# Patient Record
Sex: FEMALE | Race: WHITE | NOT HISPANIC OR LATINO | URBAN - METROPOLITAN AREA
[De-identification: names, ages, dates, MRNs, and addresses within clinical notes are randomized per-mention and may not be internally consistent; named-entity substitution may affect disease eponyms.]

---

## 2017-05-31 ENCOUNTER — OUTPATIENT (OUTPATIENT)
Dept: OUTPATIENT SERVICES | Facility: HOSPITAL | Age: 82
LOS: 1 days | Discharge: HOME | End: 2017-05-31

## 2017-06-28 DIAGNOSIS — N18.2 CHRONIC KIDNEY DISEASE, STAGE 2 (MILD): ICD-10-CM

## 2017-06-28 DIAGNOSIS — K76.89 OTHER SPECIFIED DISEASES OF LIVER: ICD-10-CM

## 2017-06-28 DIAGNOSIS — E78.00 PURE HYPERCHOLESTEROLEMIA, UNSPECIFIED: ICD-10-CM

## 2017-06-28 DIAGNOSIS — E55.9 VITAMIN D DEFICIENCY, UNSPECIFIED: ICD-10-CM

## 2017-06-28 DIAGNOSIS — N39.0 URINARY TRACT INFECTION, SITE NOT SPECIFIED: ICD-10-CM

## 2018-05-30 PROBLEM — Z00.00 ENCOUNTER FOR PREVENTIVE HEALTH EXAMINATION: Status: ACTIVE | Noted: 2018-05-30

## 2018-06-20 ENCOUNTER — OUTPATIENT (OUTPATIENT)
Dept: OUTPATIENT SERVICES | Facility: HOSPITAL | Age: 83
LOS: 1 days | Discharge: HOME | End: 2018-06-20

## 2018-06-20 DIAGNOSIS — I49.3 VENTRICULAR PREMATURE DEPOLARIZATION: ICD-10-CM

## 2018-06-20 DIAGNOSIS — E78.2 MIXED HYPERLIPIDEMIA: ICD-10-CM

## 2018-06-20 DIAGNOSIS — I10 ESSENTIAL (PRIMARY) HYPERTENSION: ICD-10-CM

## 2018-06-20 DIAGNOSIS — E11.9 TYPE 2 DIABETES MELLITUS WITHOUT COMPLICATIONS: ICD-10-CM

## 2018-06-28 ENCOUNTER — APPOINTMENT (OUTPATIENT)
Age: 83
End: 2018-06-28

## 2018-06-28 ENCOUNTER — OUTPATIENT (OUTPATIENT)
Dept: OUTPATIENT SERVICES | Facility: HOSPITAL | Age: 83
LOS: 1 days | Discharge: HOME | End: 2018-06-28

## 2018-06-28 VITALS
HEIGHT: 63 IN | WEIGHT: 134 LBS | BODY MASS INDEX: 23.74 KG/M2 | DIASTOLIC BLOOD PRESSURE: 65 MMHG | HEART RATE: 81 BPM | SYSTOLIC BLOOD PRESSURE: 122 MMHG

## 2018-06-28 DIAGNOSIS — Z86.79 PERSONAL HISTORY OF OTHER DISEASES OF THE CIRCULATORY SYSTEM: ICD-10-CM

## 2018-06-28 DIAGNOSIS — Z86.39 PERSONAL HISTORY OF OTHER ENDOCRINE, NUTRITIONAL AND METABOLIC DISEASE: ICD-10-CM

## 2018-06-28 DIAGNOSIS — Z78.9 OTHER SPECIFIED HEALTH STATUS: ICD-10-CM

## 2018-06-28 DIAGNOSIS — R31.9 HEMATURIA, UNSPECIFIED: ICD-10-CM

## 2018-06-28 DIAGNOSIS — Z83.3 FAMILY HISTORY OF DIABETES MELLITUS: ICD-10-CM

## 2018-06-28 DIAGNOSIS — Z82.5 FAMILY HISTORY OF ASTHMA AND OTHER CHRONIC LOWER RESPIRATORY DISEASES: ICD-10-CM

## 2018-06-28 RX ORDER — SIMVASTATIN 20 MG/1
20 TABLET, FILM COATED ORAL
Refills: 0 | Status: ACTIVE | COMMUNITY

## 2018-06-28 RX ORDER — GLIPIZIDE 5 MG/1
5 TABLET, FILM COATED, EXTENDED RELEASE ORAL
Refills: 0 | Status: ACTIVE | COMMUNITY

## 2018-06-28 RX ORDER — CLOPIDOGREL BISULFATE 75 MG/1
75 TABLET, FILM COATED ORAL
Refills: 0 | Status: ACTIVE | COMMUNITY

## 2018-06-28 RX ORDER — METFORMIN HYDROCHLORIDE 500 MG/1
500 TABLET, FILM COATED, EXTENDED RELEASE ORAL
Refills: 0 | Status: ACTIVE | COMMUNITY

## 2018-06-28 RX ORDER — ENALAPRIL MALEATE 2.5 MG/1
2.5 TABLET ORAL
Refills: 0 | Status: ACTIVE | COMMUNITY

## 2018-06-29 DIAGNOSIS — R31.9 HEMATURIA, UNSPECIFIED: ICD-10-CM

## 2018-06-29 LAB
APPEARANCE: CLEAR
BILIRUBIN URINE: NEGATIVE
BLOOD URINE: NEGATIVE
COLOR: YELLOW
GLUCOSE QUALITATIVE U: NEGATIVE MG/DL
KETONES URINE: NEGATIVE
LEUKOCYTE ESTERASE URINE: NEGATIVE
NITRITE URINE: NEGATIVE
PH URINE: 6
PROTEIN URINE: NEGATIVE MG/DL
SPECIFIC GRAVITY URINE: <=1.005
UROBILINOGEN URINE: 0.2 MG/DL (ref 0.2–?)

## 2018-07-02 LAB — BACTERIA UR CULT: NORMAL

## 2018-07-03 ENCOUNTER — INPATIENT (INPATIENT)
Facility: HOSPITAL | Age: 83
LOS: 2 days | Discharge: HOME | End: 2018-07-06
Attending: INTERNAL MEDICINE | Admitting: INTERNAL MEDICINE

## 2018-07-03 VITALS
TEMPERATURE: 99 F | OXYGEN SATURATION: 99 % | DIASTOLIC BLOOD PRESSURE: 68 MMHG | SYSTOLIC BLOOD PRESSURE: 154 MMHG | RESPIRATION RATE: 18 BRPM | HEART RATE: 79 BPM

## 2018-07-03 LAB
ALBUMIN SERPL ELPH-MCNC: 4.6 G/DL — SIGNIFICANT CHANGE UP (ref 3.5–5.2)
ALP SERPL-CCNC: 48 U/L — SIGNIFICANT CHANGE UP (ref 30–115)
ALT FLD-CCNC: 9 U/L — SIGNIFICANT CHANGE UP (ref 0–41)
ANION GAP SERPL CALC-SCNC: 16 MMOL/L — HIGH (ref 7–14)
APTT BLD: 31.8 SEC — SIGNIFICANT CHANGE UP (ref 27–39.2)
AST SERPL-CCNC: 15 U/L — SIGNIFICANT CHANGE UP (ref 0–41)
BASE EXCESS BLDV CALC-SCNC: 1 MMOL/L — SIGNIFICANT CHANGE UP (ref -2–2)
BASOPHILS # BLD AUTO: 0.05 K/UL — SIGNIFICANT CHANGE UP (ref 0–0.2)
BASOPHILS NFR BLD AUTO: 0.5 % — SIGNIFICANT CHANGE UP (ref 0–1)
BILIRUB SERPL-MCNC: 0.4 MG/DL — SIGNIFICANT CHANGE UP (ref 0.2–1.2)
BLD GP AB SCN SERPL QL: SIGNIFICANT CHANGE UP
BUN SERPL-MCNC: 17 MG/DL — SIGNIFICANT CHANGE UP (ref 10–20)
CA-I SERPL-SCNC: 1.21 MMOL/L — SIGNIFICANT CHANGE UP (ref 1.12–1.3)
CALCIUM SERPL-MCNC: 9.6 MG/DL — SIGNIFICANT CHANGE UP (ref 8.5–10.1)
CHLORIDE SERPL-SCNC: 99 MMOL/L — SIGNIFICANT CHANGE UP (ref 98–110)
CK MB CFR SERPL CALC: 1.8 NG/ML — SIGNIFICANT CHANGE UP (ref 0.6–6.3)
CK SERPL-CCNC: 181 U/L — SIGNIFICANT CHANGE UP (ref 0–225)
CO2 SERPL-SCNC: 21 MMOL/L — SIGNIFICANT CHANGE UP (ref 17–32)
CREAT SERPL-MCNC: 0.9 MG/DL — SIGNIFICANT CHANGE UP (ref 0.7–1.5)
EOSINOPHIL # BLD AUTO: 0.12 K/UL — SIGNIFICANT CHANGE UP (ref 0–0.7)
EOSINOPHIL NFR BLD AUTO: 1.2 % — SIGNIFICANT CHANGE UP (ref 0–8)
GAS PNL BLDV: 136 MMOL/L — SIGNIFICANT CHANGE UP (ref 136–145)
GAS PNL BLDV: SIGNIFICANT CHANGE UP
GLUCOSE SERPL-MCNC: 58 MG/DL — LOW (ref 70–99)
HCO3 BLDV-SCNC: 26 MMOL/L — SIGNIFICANT CHANGE UP (ref 22–29)
HCT VFR BLD CALC: 37.3 % — SIGNIFICANT CHANGE UP (ref 37–47)
HCT VFR BLDA CALC: 43.2 % — SIGNIFICANT CHANGE UP (ref 34–44)
HGB BLD CALC-MCNC: 14.1 G/DL — SIGNIFICANT CHANGE UP (ref 14–18)
HGB BLD-MCNC: 12.6 G/DL — SIGNIFICANT CHANGE UP (ref 12–16)
IMM GRANULOCYTES NFR BLD AUTO: 0.3 % — SIGNIFICANT CHANGE UP (ref 0.1–0.3)
INR BLD: 1.13 RATIO — SIGNIFICANT CHANGE UP (ref 0.65–1.3)
LACTATE BLDV-MCNC: 1.2 MMOL/L — SIGNIFICANT CHANGE UP (ref 0.5–1.6)
LACTATE SERPL-SCNC: 1.3 MMOL/L — SIGNIFICANT CHANGE UP (ref 0.5–2.2)
LYMPHOCYTES # BLD AUTO: 2.57 K/UL — SIGNIFICANT CHANGE UP (ref 1.2–3.4)
LYMPHOCYTES # BLD AUTO: 26.7 % — SIGNIFICANT CHANGE UP (ref 20.5–51.1)
MCHC RBC-ENTMCNC: 29.9 PG — SIGNIFICANT CHANGE UP (ref 27–31)
MCHC RBC-ENTMCNC: 33.8 G/DL — SIGNIFICANT CHANGE UP (ref 32–37)
MCV RBC AUTO: 88.6 FL — SIGNIFICANT CHANGE UP (ref 81–99)
MONOCYTES # BLD AUTO: 0.91 K/UL — HIGH (ref 0.1–0.6)
MONOCYTES NFR BLD AUTO: 9.4 % — HIGH (ref 1.7–9.3)
NEUTROPHILS # BLD AUTO: 5.95 K/UL — SIGNIFICANT CHANGE UP (ref 1.4–6.5)
NEUTROPHILS NFR BLD AUTO: 61.9 % — SIGNIFICANT CHANGE UP (ref 42.2–75.2)
NRBC # BLD: 0 /100 WBCS — SIGNIFICANT CHANGE UP (ref 0–0)
PCO2 BLDV: 42 MMHG — SIGNIFICANT CHANGE UP (ref 41–51)
PH BLDV: 7.4 — SIGNIFICANT CHANGE UP (ref 7.26–7.43)
PLATELET # BLD AUTO: 309 K/UL — SIGNIFICANT CHANGE UP (ref 130–400)
PO2 BLDV: 17 MMHG — LOW (ref 20–40)
POTASSIUM BLDV-SCNC: 4 MMOL/L — SIGNIFICANT CHANGE UP (ref 3.3–5.6)
POTASSIUM SERPL-MCNC: 4.4 MMOL/L — SIGNIFICANT CHANGE UP (ref 3.5–5)
POTASSIUM SERPL-SCNC: 4.4 MMOL/L — SIGNIFICANT CHANGE UP (ref 3.5–5)
PROT SERPL-MCNC: 6.8 G/DL — SIGNIFICANT CHANGE UP (ref 6–8)
PROTHROM AB SERPL-ACNC: 12.2 SEC — SIGNIFICANT CHANGE UP (ref 9.95–12.87)
RBC # BLD: 4.21 M/UL — SIGNIFICANT CHANGE UP (ref 4.2–5.4)
RBC # FLD: 13.6 % — SIGNIFICANT CHANGE UP (ref 11.5–14.5)
SAO2 % BLDV: 18 % — SIGNIFICANT CHANGE UP
SODIUM SERPL-SCNC: 136 MMOL/L — SIGNIFICANT CHANGE UP (ref 135–146)
TROPONIN T SERPL-MCNC: <0.01 NG/ML — SIGNIFICANT CHANGE UP
TYPE + AB SCN PNL BLD: SIGNIFICANT CHANGE UP
WBC # BLD: 9.63 K/UL — SIGNIFICANT CHANGE UP (ref 4.8–10.8)
WBC # FLD AUTO: 9.63 K/UL — SIGNIFICANT CHANGE UP (ref 4.8–10.8)

## 2018-07-03 RX ORDER — CLOPIDOGREL BISULFATE 75 MG/1
75 TABLET, FILM COATED ORAL DAILY
Qty: 0 | Refills: 0 | Status: DISCONTINUED | OUTPATIENT
Start: 2018-07-03 | End: 2018-07-05

## 2018-07-03 RX ORDER — GLUCAGON INJECTION, SOLUTION 0.5 MG/.1ML
1 INJECTION, SOLUTION SUBCUTANEOUS ONCE
Qty: 0 | Refills: 0 | Status: DISCONTINUED | OUTPATIENT
Start: 2018-07-03 | End: 2018-07-06

## 2018-07-03 RX ORDER — INSULIN LISPRO 100/ML
VIAL (ML) SUBCUTANEOUS
Qty: 0 | Refills: 0 | Status: DISCONTINUED | OUTPATIENT
Start: 2018-07-03 | End: 2018-07-06

## 2018-07-03 RX ORDER — DEXTROSE 50 % IN WATER 50 %
15 SYRINGE (ML) INTRAVENOUS ONCE
Qty: 0 | Refills: 0 | Status: DISCONTINUED | OUTPATIENT
Start: 2018-07-03 | End: 2018-07-06

## 2018-07-03 RX ORDER — DEXTROSE 50 % IN WATER 50 %
12.5 SYRINGE (ML) INTRAVENOUS ONCE
Qty: 0 | Refills: 0 | Status: DISCONTINUED | OUTPATIENT
Start: 2018-07-03 | End: 2018-07-06

## 2018-07-03 RX ORDER — SODIUM CHLORIDE 9 MG/ML
1000 INJECTION, SOLUTION INTRAVENOUS
Qty: 0 | Refills: 0 | Status: DISCONTINUED | OUTPATIENT
Start: 2018-07-03 | End: 2018-07-06

## 2018-07-03 RX ORDER — DEXTROSE 50 % IN WATER 50 %
25 SYRINGE (ML) INTRAVENOUS ONCE
Qty: 0 | Refills: 0 | Status: DISCONTINUED | OUTPATIENT
Start: 2018-07-03 | End: 2018-07-06

## 2018-07-03 RX ORDER — INSULIN LISPRO 100/ML
3 VIAL (ML) SUBCUTANEOUS
Qty: 0 | Refills: 0 | Status: DISCONTINUED | OUTPATIENT
Start: 2018-07-03 | End: 2018-07-06

## 2018-07-03 RX ORDER — ENOXAPARIN SODIUM 100 MG/ML
60 INJECTION SUBCUTANEOUS EVERY 12 HOURS
Qty: 0 | Refills: 0 | Status: DISCONTINUED | OUTPATIENT
Start: 2018-07-03 | End: 2018-07-05

## 2018-07-03 RX ORDER — SIMVASTATIN 20 MG/1
0 TABLET, FILM COATED ORAL
Qty: 0 | Refills: 0 | COMMUNITY

## 2018-07-03 RX ORDER — ENOXAPARIN SODIUM 100 MG/ML
40 INJECTION SUBCUTANEOUS AT BEDTIME
Qty: 0 | Refills: 0 | Status: DISCONTINUED | OUTPATIENT
Start: 2018-07-03 | End: 2018-07-03

## 2018-07-03 RX ORDER — SIMVASTATIN 20 MG/1
20 TABLET, FILM COATED ORAL AT BEDTIME
Qty: 0 | Refills: 0 | Status: DISCONTINUED | OUTPATIENT
Start: 2018-07-03 | End: 2018-07-03

## 2018-07-03 RX ORDER — ASPIRIN/CALCIUM CARB/MAGNESIUM 324 MG
81 TABLET ORAL DAILY
Qty: 0 | Refills: 0 | Status: DISCONTINUED | OUTPATIENT
Start: 2018-07-03 | End: 2018-07-06

## 2018-07-03 RX ORDER — CLOPIDOGREL BISULFATE 75 MG/1
0 TABLET, FILM COATED ORAL
Qty: 0 | Refills: 0 | COMMUNITY

## 2018-07-03 RX ORDER — INSULIN GLARGINE 100 [IU]/ML
9 INJECTION, SOLUTION SUBCUTANEOUS AT BEDTIME
Qty: 0 | Refills: 0 | Status: DISCONTINUED | OUTPATIENT
Start: 2018-07-03 | End: 2018-07-06

## 2018-07-03 RX ORDER — ATORVASTATIN CALCIUM 80 MG/1
80 TABLET, FILM COATED ORAL AT BEDTIME
Qty: 0 | Refills: 0 | Status: DISCONTINUED | OUTPATIENT
Start: 2018-07-03 | End: 2018-07-06

## 2018-07-03 NOTE — H&P ADULT - NSHPLABSRESULTS_GEN_ALL_CORE
12.6   9.63   )----------(  309       ( 03 Jul 2018 14:01 )               37.3        07-03    136  |  99  |  17  ----------------------------<  58<L>  4.4   |  21  |  0.9    Ca    9.6      03 Jul 2018 14:01    TPro  6.8  /  Alb  4.6  /  TBili  0.4  /  DBili  x   /  AST  15  /  ALT  9   /  AlkPhos  48  07-03      CT Head:  1. No CT evidence for acute intracranial hemorrhage or large territory   infarct.     2. Extensive chronic microvascular changes, mildly progressed since   1/19/2016.

## 2018-07-03 NOTE — H&P ADULT - NSHPPHYSICALEXAM_GEN_ALL_CORE
T(F): 98.1 (07-03-18 @ 17:03), Max: 99.1 (07-03-18 @ 13:54)  HR: 75 (07-03-18 @ 17:03) (70 - 79)  BP: 135/61 (07-03-18 @ 17:03) (124/58 - 154/68)  RR: 18 (07-03-18 @ 17:03) (18 - 18)  SpO2: 98% (07-03-18 @ 17:03) (98% - 100%)    PHYSICAL EXAM:  GENERAL: NAD, well-developed  HEAD:  Atraumatic, Normocephalic  EYES: EOMI, PERRLA, conjunctiva and sclera clear  NECK: Supple, No JVD  CHEST/LUNG: Clear to auscultation bilaterally; No wheeze  HEART: Regular rate and rhythm; No murmurs, rubs, or gallops  ABDOMEN: Soft, Nontender, Nondistended; Bowel sounds present  EXTREMITIES:  2+ Peripheral Pulses, No clubbing, cyanosis, or edema  PSYCH: AAOx3  NEUROLOGY: non-focal  SKIN: No rashes or lesions T(F): 98.1 (07-03-18 @ 17:03), Max: 99.1 (07-03-18 @ 13:54)  HR: 75 (07-03-18 @ 17:03) (70 - 79)  BP: 135/61 (07-03-18 @ 17:03) (124/58 - 154/68)  RR: 18 (07-03-18 @ 17:03) (18 - 18)  SpO2: 98% (07-03-18 @ 17:03) (98% - 100%)    PHYSICAL EXAM:  GENERAL: NAD, well-developed  HEAD:  Atraumatic, Normocephalic  EYES: EOMI, PERRLA, conjunctiva and sclera clear  NECK: Supple, No JVD  CHEST/LUNG: Clear to auscultation bilaterally; No wheeze  HEART: Regular rate and rhythm; No murmurs, rubs, or gallops  ABDOMEN: Soft, Nontender, Nondistended; Bowel sounds present  EXTREMITIES:  2+ Peripheral Pulses, No clubbing, cyanosis, or edema  PSYCH: AAOx3  NEUROLOGY: non-focal. NIHSS 0.  Alert oriented x 3.  Motor strenght 5/5.  Sensory intact.  CN 2 - 12 intact. No loss of proprioception.  No ataxia.    SKIN: No rashes or lesions

## 2018-07-03 NOTE — ED PROVIDER NOTE - ATTENDING CONTRIBUTION TO CARE
89 yo female with PMH DM, Afib, TIAs on Plavix presents with new onset confusion and slurred speech that started 1 hours prior to arrival. Pt was with daughter who states she was her normal self prior to that. Denies any falls or trauma. No fevers, chills, cough or weakness. Pt denies any HA, dizziness, CP, SOB or palpitations.     VITAL SIGNS: noted  CONSTITUTIONAL: Well-developed; well-nourished; in no acute distress  HEAD: Normocephalic; atraumatic  EYES: PERRL, EOM intact; conjunctiva and sclera clear  ENT: No nasal discharge; airway clear. MMM  NECK: Supple; non tender.    CARD: S1, S2 normal; no murmurs, gallops, or rubs. Regular rate and rhythm  RESP: CTAB/L, no wheezes, rales or rhonchi  ABD: Normal bowel sounds; soft; non-distended; non-tender   EXT: Normal ROM. No calf tenderness or edema. Distal pulses intact  NEURO: AAO x 3, + anomia and difficulty with reading sentences with some slurring, no facial asymmetry, negative pronator drift, no ataxia, no nystagmus, sensory equal and intact.

## 2018-07-03 NOTE — H&P ADULT - PMH
Atrial fibrillation    Diabetes mellitus    Hyperlipidemia    Hypertension    TIA (transient ischemic attack)

## 2018-07-03 NOTE — PROGRESS NOTE ADULT - SUBJECTIVE AND OBJECTIVE BOX
***STROKE ALERT CONSULT NOTE    Chief Complaint: stroke alert  Overhead page at 1:50 p.m.    Last known normal time: 12:20    HPI:  Pt is 88 year old female with hypertension, hyperlipidemia, diabetes, and newly diagnosed atrial fibrillation presents for aphasia of sudden onset.  Last known normal at 12:20 her daughter spoke to her at 12:50 and patient was having difficulty with her words.  EMS was activated and en route  some improvement was noted.  Patient did not have weakness or numbness and has two prior TIA episodes involving transient aphasia one in oct 2015   and another in Jan 2016.       PAST MEDICAL & SURGICAL HISTORY:  NIDDM  HTN  Hyperlipidemia  Aortic valve stenosis      FAMILY HISTORY:  noncontributory    Social History: (-) x 3    Allergies    penicillin (Anaphylaxis; Hives)    Intolerances        MEDICATIONS  (STANDING):  plavix 75 mg/day  simvastatin 20 mg/day  enalapril 2.5 mg /day  glyburide  5 mg bid  Metformin 500 mg bid  MEDICATIONS  (PRN):      Review of systems:    Constitutional: No fever, weight loss or fatigue    Eyes: No eye pain or discharge  ENMT:  No difficulty hearing; No sinus or throat pain  Neck: No pain or stiffness  Respiratory: No cough, wheezing, chills or hemoptysis  Cardiovascular: No chest pain, palpitations, shortness of breath, dyspnea on exertion  Gastrointestinal: No abdominal pain, nausea, vomiting or hematemesis; No diarrhea or constipation.   Genitourinary: No dysuria, frequency, hematuria or incontinence  Neurological: As per HPI  Skin: No rashes or lesions   Endocrine: No heat or cold intolerance; No hair loss  Musculoskeletal: No joint pain or swelling  Psychiatric: No depression, anxiety, mood swings  Heme/Lymph: No easy bruising or bleeding gums    Vital Signs Last 24 Hrs  T(C): 37.3 (03 Jul 2018 13:54), Max: 37.3 (03 Jul 2018 13:54)  T(F): 99.1 (03 Jul 2018 13:54), Max: 99.1 (03 Jul 2018 13:54)  HR: 79 (03 Jul 2018 13:54) (79 - 79)  BP: 154/68 (03 Jul 2018 13:54) (154/68 - 154/68)  BP(mean): --  RR: 18 (03 Jul 2018 13:54) (18 - 18)  SpO2: 99% (03 Jul 2018 13:54) (99% - 99%)    Neurologic Examination:  General:  Appearance is consistent with chronologic age.   General: The patient is oriented to person, place, time and date.   Fund of knowledge is intact and normal.  Language with impaired repetition and naming.  Comprehension was pretty good.  Nondysarthric. Cranial nerves: intact VA, VFF (though difficult to test).  EOMI.  PERRL.  No ptosis/weakness of eyelid closure.  Facial sensation is normal.  No facial asymmetry.  Hearing grossly impaired b/l.  Palate elevates midline.  Tongue midline.  Motor examination:   Normal tone, bulk and range of motion.  No tenderness, twitching, tremors or involuntary movements.  Formal Muscle Strength Testing: (MRC grade R/L) 5/5 UE; 5/5 LE.  No observable drift.  Reflexes:   2+ b/l  biceps, triceps, brachioradialis, 1+ patella and Achilles.  Plantar response downgoing b/l.    Sensory examination:   Intact to light touch and pinprick, pain, temperature and vibration in all extremities.  Cerebellum:   FTN/HKS intact with normal JOAQUINA in all limbs.  No dysmetria or dysdiadokinesia.    NIH Stroke Scale: 1/42    Labs:   CBC Full  -  ( 03 Jul 2018 14:01 )  WBC Count : 9.63 K/uL  Hemoglobin : 12.6 g/dL  Hematocrit : 37.3 %  Platelet Count - Automated : 309 K/uL  Mean Cell Volume : 88.6 fL  Mean Cell Hemoglobin : 29.9 pg  Mean Cell Hemoglobin Concentration : 33.8 g/dL  Auto Neutrophil # : 5.95 K/uL  Auto Lymphocyte # : 2.57 K/uL  Auto Monocyte # : 0.91 K/uL  Auto Eosinophil # : 0.12 K/uL  Auto Basophil # : 0.05 K/uL  Auto Neutrophil % : 61.9 %  Auto Lymphocyte % : 26.7 %  Auto Monocyte % : 9.4 %  Auto Eosinophil % : 1.2 %  Auto Basophil % : 0.5 %    07-03    136  |  99  |  17  ----------------------------<  58<L>  4.4   |  21  |  0.9    Ca    9.6      03 Jul 2018 14:01    TPro  6.8  /  Alb  4.6  /  TBili  0.4  /  DBili  x   /  AST  15  /  ALT  9   /  AlkPhos  48  07-03    LIVER FUNCTIONS - ( 03 Jul 2018 14:01 )  Alb: 4.6 g/dL / Pro: 6.8 g/dL / ALK PHOS: 48 U/L / ALT: 9 U/L / AST: 15 U/L / GGT: x           PT/INR - ( 03 Jul 2018 14:01 )   PT: 12.20 sec;   INR: 1.13 ratio         PTT - ( 03 Jul 2018 14:01 )  PTT:31.8 sec        Neuroimaging:  < from: CT Brain Stroke Protocol (07.03.18 @ 14:21) >  1. No CT evidence for acute intracranial hemorrhage or large territory   infarct.     2. Extensive chronic microvascular changes, mildly progressed since   1/19/2016.    < end of copied text >      Assessment:  This is a 88y Female with h/o HTN, DM, hyperlipidemia and newly diagnosed afib who presents with acute stroke symptoms manifest by partial aphasia.  This improved though not fully.  Her overall stroke score is 1/42 and family and patient do not wish her to get TPA at this time.      Risks and benefits including alternatives to treatment with thrombolysis with IV TPA (alteplase) discussed with patient/family at length including significant morbidity/mortality with or without treatment in acute stroke setting.  All questions and concerns at the time of evaluation answered and addressed.      Plan:   1.  Brain MRI brain and MRA head noncontrast, MRA neck with contrast.  2.  Cardiology consultation for anticoagulation for afib.  3.  PT/OT/Speech therapy evaluation.    07-03-18 @ 15:35

## 2018-07-03 NOTE — H&P ADULT - ATTENDING COMMENTS
88 year old female with PMHx significant for DM, HTN, DLD and history of TIA's presents due to new onset slurred speech and difficulty articulating that started today at 12:50.  Pt was NIHSS 1 for performing one task correctly.  CT head was negative for acute stroke.  Pt symptoms continued to improve.  She was offered tPA but pt's family refused.      1. TIA       Brain MRI brain and MRA head noncontrast, MRA neck with contrast.     c/w aspirin, plavix, atorvastatin Lovenox     f/u hemoglobin a1c, lipid profile   PT/OT/Speech therapy evaluation    2. Hx of Atrial Fibrillation     Chads vasc 6       c/w  lovenox       cardioeval    3.  HTN      c/w enalapril    4. HLD    start atorvastatin 80 mg    5.  DM     c/w ISS       Nutrition: DASH/ TLC/ Carb consistent  Prophylaxis: Full AC with lovenox    #Dispo: from home    #Code Status: Full

## 2018-07-03 NOTE — ED ADULT TRIAGE NOTE - CHIEF COMPLAINT QUOTE
pt brought to ED by daughter, sudden onset of confusion that started 45 mins ago, pt alert & responsive, unable to answer all questions appropriately, hx tia on plavix, stroke code initiated.

## 2018-07-03 NOTE — ED ADULT NURSE REASSESSMENT NOTE - NS ED NURSE REASSESS COMMENT FT1
patient awake/alert/oriented. v/s stable. no acute distress. iv patent. safety maintained. gcs 15. perrla noted

## 2018-07-03 NOTE — H&P ADULT - NSHPSOCIALHISTORY_GEN_ALL_CORE
Marital Status:  (   )    (  X ) Single    (   )    (  )   Occupation:   Lives with: (X  ) alone  (  ) children   (  ) spouse   (  ) parents  (  ) other    Substance Use (street drugs): ( X) never used  (  ) other:  Tobacco Usage:  (X   ) never smoked   (   ) former smoker   (   ) current smoker  (     ) pack year  (        ) last cigarette date  Alcohol Usage: none

## 2018-07-03 NOTE — ED PROVIDER NOTE - NEURO NEGATIVE STATEMENT, MLM
+ confused, + slurred speech, no loss of consciousness, no gait abnormality, no headache, no sensory deficits, and no weakness.

## 2018-07-03 NOTE — ED ADULT NURSE NOTE - OBJECTIVE STATEMENT
as per family, patient had sudden onset of confusion approx one hour prior to arrival. patient has no facial droop, no slurred speech. stroke called called in triage

## 2018-07-04 LAB
ANION GAP SERPL CALC-SCNC: 15 MMOL/L — HIGH (ref 7–14)
BASOPHILS # BLD AUTO: 0.03 K/UL — SIGNIFICANT CHANGE UP (ref 0–0.2)
BASOPHILS NFR BLD AUTO: 0.5 % — SIGNIFICANT CHANGE UP (ref 0–1)
BUN SERPL-MCNC: 16 MG/DL — SIGNIFICANT CHANGE UP (ref 10–20)
CALCIUM SERPL-MCNC: 10 MG/DL — SIGNIFICANT CHANGE UP (ref 8.5–10.1)
CHLORIDE SERPL-SCNC: 103 MMOL/L — SIGNIFICANT CHANGE UP (ref 98–110)
CHOLEST SERPL-MCNC: 153 MG/DL — SIGNIFICANT CHANGE UP (ref 100–200)
CO2 SERPL-SCNC: 23 MMOL/L — SIGNIFICANT CHANGE UP (ref 17–32)
CREAT SERPL-MCNC: 0.9 MG/DL — SIGNIFICANT CHANGE UP (ref 0.7–1.5)
EOSINOPHIL # BLD AUTO: 0.21 K/UL — SIGNIFICANT CHANGE UP (ref 0–0.7)
EOSINOPHIL NFR BLD AUTO: 3.2 % — SIGNIFICANT CHANGE UP (ref 0–8)
ESTIMATED AVERAGE GLUCOSE: 148 MG/DL — HIGH (ref 68–114)
GLUCOSE SERPL-MCNC: 130 MG/DL — HIGH (ref 70–99)
HBA1C BLD-MCNC: 6.8 % — HIGH (ref 4–5.6)
HCT VFR BLD CALC: 37.5 % — SIGNIFICANT CHANGE UP (ref 37–47)
HDLC SERPL-MCNC: 54 MG/DL — SIGNIFICANT CHANGE UP (ref 40–125)
HGB BLD-MCNC: 12.8 G/DL — SIGNIFICANT CHANGE UP (ref 12–16)
IMM GRANULOCYTES NFR BLD AUTO: 0.2 % — SIGNIFICANT CHANGE UP (ref 0.1–0.3)
LIPID PNL WITH DIRECT LDL SERPL: 85 MG/DL — SIGNIFICANT CHANGE UP (ref 4–129)
LYMPHOCYTES # BLD AUTO: 1.44 K/UL — SIGNIFICANT CHANGE UP (ref 1.2–3.4)
LYMPHOCYTES # BLD AUTO: 21.6 % — SIGNIFICANT CHANGE UP (ref 20.5–51.1)
MAGNESIUM SERPL-MCNC: 2.5 MG/DL — HIGH (ref 1.8–2.4)
MCHC RBC-ENTMCNC: 30.5 PG — SIGNIFICANT CHANGE UP (ref 27–31)
MCHC RBC-ENTMCNC: 34.1 G/DL — SIGNIFICANT CHANGE UP (ref 32–37)
MCV RBC AUTO: 89.3 FL — SIGNIFICANT CHANGE UP (ref 81–99)
MONOCYTES # BLD AUTO: 0.69 K/UL — HIGH (ref 0.1–0.6)
MONOCYTES NFR BLD AUTO: 10.4 % — HIGH (ref 1.7–9.3)
NEUTROPHILS # BLD AUTO: 4.28 K/UL — SIGNIFICANT CHANGE UP (ref 1.4–6.5)
NEUTROPHILS NFR BLD AUTO: 64.1 % — SIGNIFICANT CHANGE UP (ref 42.2–75.2)
NRBC # BLD: 0 /100 WBCS — SIGNIFICANT CHANGE UP (ref 0–0)
PLATELET # BLD AUTO: 279 K/UL — SIGNIFICANT CHANGE UP (ref 130–400)
POTASSIUM SERPL-MCNC: 5.4 MMOL/L — HIGH (ref 3.5–5)
POTASSIUM SERPL-SCNC: 5.4 MMOL/L — HIGH (ref 3.5–5)
RBC # BLD: 4.2 M/UL — SIGNIFICANT CHANGE UP (ref 4.2–5.4)
RBC # FLD: 13.6 % — SIGNIFICANT CHANGE UP (ref 11.5–14.5)
SODIUM SERPL-SCNC: 141 MMOL/L — SIGNIFICANT CHANGE UP (ref 135–146)
TOTAL CHOLESTEROL/HDL RATIO MEASUREMENT: 2.8 RATIO — LOW (ref 4–5.5)
TRIGL SERPL-MCNC: 106 MG/DL — SIGNIFICANT CHANGE UP (ref 10–149)
WBC # BLD: 6.66 K/UL — SIGNIFICANT CHANGE UP (ref 4.8–10.8)
WBC # FLD AUTO: 6.66 K/UL — SIGNIFICANT CHANGE UP (ref 4.8–10.8)

## 2018-07-04 RX ADMIN — ENOXAPARIN SODIUM 60 MILLIGRAM(S): 100 INJECTION SUBCUTANEOUS at 18:12

## 2018-07-04 RX ADMIN — CLOPIDOGREL BISULFATE 75 MILLIGRAM(S): 75 TABLET, FILM COATED ORAL at 11:29

## 2018-07-04 RX ADMIN — Medication 2.5 MILLIGRAM(S): at 05:05

## 2018-07-04 RX ADMIN — Medication 1: at 18:13

## 2018-07-04 RX ADMIN — Medication 81 MILLIGRAM(S): at 11:29

## 2018-07-04 RX ADMIN — ATORVASTATIN CALCIUM 80 MILLIGRAM(S): 80 TABLET, FILM COATED ORAL at 00:22

## 2018-07-04 RX ADMIN — Medication 3 UNIT(S): at 18:14

## 2018-07-04 RX ADMIN — INSULIN GLARGINE 9 UNIT(S): 100 INJECTION, SOLUTION SUBCUTANEOUS at 21:55

## 2018-07-04 RX ADMIN — ATORVASTATIN CALCIUM 80 MILLIGRAM(S): 80 TABLET, FILM COATED ORAL at 21:55

## 2018-07-04 RX ADMIN — ENOXAPARIN SODIUM 60 MILLIGRAM(S): 100 INJECTION SUBCUTANEOUS at 05:05

## 2018-07-04 RX ADMIN — Medication 3 UNIT(S): at 12:24

## 2018-07-04 NOTE — PROGRESS NOTE ADULT - SUBJECTIVE AND OBJECTIVE BOX
ALBARO HAMILTON  88y  Female    Patient is a 88y old  Female who presents with a chief complaint of Slurred speech and difficulty speaking (03 Jul 2018 17:33)      INTERVAL HPI/OVERNIGHT EVENTS: None    T(C): 36.6 (07-04-18 @ 20:15), Max: 36.6 (07-04-18 @ 20:15)  HR: 81 (07-04-18 @ 20:15) (71 - 81)  BP: 150/67 (07-04-18 @ 20:15) (108/54 - 150/67)  RR: 19 (07-04-18 @ 20:15) (16 - 19)  SpO2: --  Wt(kg): --Vital Signs Last 24 Hrs  T(C): 36.6 (04 Jul 2018 20:15), Max: 36.6 (04 Jul 2018 20:15)  T(F): 97.9 (04 Jul 2018 20:15), Max: 97.9 (04 Jul 2018 20:15)  HR: 81 (04 Jul 2018 20:15) (71 - 81)  BP: 150/67 (04 Jul 2018 20:15) (108/54 - 150/67)  BP(mean): --  RR: 19 (04 Jul 2018 20:15) (16 - 19)  SpO2: --    PHYSICAL EXAM:  GENERAL: NAD, well-groomed, well-developed  HEAD:  Atraumatic, Normocephalic  NECK: Supple, No JVD, Normal thyroid  NERVOUS SYSTEM:  Alert & Oriented X3, Good concentration; Motor Strength 5/5 B/L upper and lower extremities; DTRs 2+ intact and symmetric  CHEST/LUNG: Clear to percussion bilaterally; No rales, rhonchi, wheezing, or rubs  HEART: Regular rate and rhythm; No murmurs, rubs, or gallops  ABDOMEN: Soft, Nontender, Nondistended; Bowel sounds present  EXTREMITIES:  2+ Peripheral Pulses, No clubbing, cyanosis, or edema    Consultant(s) Notes Reviewed:  [x ] YES  [ ] NO    Discussed with Consultants/Other Providers/Residents [ x] YES     LABS                         12.8   6.66  )-----------( 279      ( 04 Jul 2018 05:35 )             37.5     07-04    141  |  103  |  16  ----------------------------<  130<H>  5.4<H>   |  23  |  0.9    Ca    10.0      04 Jul 2018 05:35  Mg     2.5     07-04    TPro  6.8  /  Alb  4.6  /  TBili  0.4  /  DBili  x   /  AST  15  /  ALT  9   /  AlkPhos  48  07-03    PT/INR - ( 03 Jul 2018 14:01 )   PT: 12.20 sec;   INR: 1.13 ratio         PTT - ( 03 Jul 2018 14:01 )  PTT:31.8 sec      LIVER FUNCTIONS - ( 03 Jul 2018 14:01 )  Alb: 4.6 g/dL / Pro: 6.8 g/dL / ALK PHOS: 48 U/L / ALT: 9 U/L / AST: 15 U/L / GGT: x           CAPILLARY BLOOD GLUCOSE            RADIOLOGY & ADDITIONAL TESTS:    Imaging Personally Reviewed:  [x ] YES  [ ] NO    MEDICATIONS  (STANDING):  aspirin  chewable 81 milliGRAM(s) Oral daily  atorvastatin 80 milliGRAM(s) Oral at bedtime  clopidogrel Tablet 75 milliGRAM(s) Oral daily  dextrose 5%. 1000 milliLiter(s) (50 mL/Hr) IV Continuous <Continuous>  dextrose 50% Injectable 12.5 Gram(s) IV Push once  dextrose 50% Injectable 25 Gram(s) IV Push once  dextrose 50% Injectable 25 Gram(s) IV Push once  enalapril 2.5 milliGRAM(s) Oral daily  enoxaparin Injectable 60 milliGRAM(s) SubCutaneous every 12 hours  insulin glargine Injectable (LANTUS) 9 Unit(s) SubCutaneous at bedtime  insulin lispro (HumaLOG) corrective regimen sliding scale   SubCutaneous three times a day before meals  insulin lispro Injectable (HumaLOG) 3 Unit(s) SubCutaneous three times a day before meals    MEDICATIONS  (PRN):  dextrose 40% Gel 15 Gram(s) Oral once PRN Blood Glucose LESS THAN 70 milliGRAM(s)/deciliter  glucagon  Injectable 1 milliGRAM(s) IntraMuscular once PRN Glucose LESS THAN 70 milligrams/deciliter      HEALTH ISSUES - PROBLEM Dx:

## 2018-07-04 NOTE — CONSULT NOTE ADULT - ASSESSMENT
88 year old female with hypertension, hyperlipidemia, diabetes, and ? newly diagnosed atrial fibrillation presents for aphasia of sudden onset.    -TIA  -? h/o Afib 88 year old female with PMH of TIA, hypertension, hyperlipidemia, diabetes, and newly diagnosed atrial fibrillation (last month) presents for aphasia of sudden onset.    -TIA  -Newly diagnosed Afib ( CHADS VAsc 7)  -h/o TIA/HTN/DLD/DM2      -cont Plavix, lipitor  -Therapeutic Lovenox for now  -will need long term AC (Coumadin vs NOAC)  -check TTE/TSH, f/u MRI/MRA  -will d/w attending.

## 2018-07-04 NOTE — CONSULT NOTE ADULT - ATTENDING COMMENTS
Pt seen in Neurology 3E1 unit and is currently without symptoms. Normal cardiac exam except AS murmur. No afib since admission. Neurology work up and echo to be done. Question of afib seen on recent halter to discuss with Dr. Cantu tomorrow. Pt on lovenox for now but may need xarelto or eliquis in place of lovenox and plavix.

## 2018-07-04 NOTE — SWALLOW BEDSIDE ASSESSMENT ADULT - ASR SWALLOW ASPIRATION MONITOR
oral hygiene/position upright (90Y)/gurgly voice/pneumonia/change of breathing pattern/cough/fever/upper respiratory infection/throat clearing

## 2018-07-04 NOTE — SWALLOW BEDSIDE ASSESSMENT ADULT - SWALLOW EVAL: DIAGNOSIS
functional oral phase of swallow no evidence of impairment of pharyngeal phase. + toleration observed without overt symptoms of penetration/aspiration

## 2018-07-04 NOTE — CONSULT NOTE ADULT - SUBJECTIVE AND OBJECTIVE BOX
HPI:  88 year old female with hypertension, hyperlipidemia, diabetes, and ? newly diagnosed atrial fibrillation presents for aphasia of sudden onset.  Last known normal at 12:20 yesterday, her daughter spoke to her at 12:50 and patient was having difficulty with her words.  EMS was activated and en routesome improvement was noted.  Patient did not have weakness or numbness and has two prior TIA episodes involving transient aphasia one in oct 2015 and another in Jan 2016.  Pt is baseline fully functional.  Family reports she lives alone and maintains all her ADL's.  She is forgetful of appointments at times but she is oriented to person, place, time and situation mostly.  No fever, no chills, no headache, no nausea, no vomiting, no chest pain, no back pain, no abdominal pain. No focal deficits.  No blurry vision.  No changes in vision, muscle weakness or sensory deficits. (03 Jul 2018 17:33)      ROS:  All other systems reviewed and are negative    PAST MEDICAL & SURGICAL HISTORY  Atrial fibrillation  TIA (transient ischemic attack)  Diabetes mellitus  Hyperlipidemia  Hypertension      FAMILY HISTORY:  FAMILY HISTORY:      SOCIAL HISTORY:  []smoker  []Alcohol  []Drug    ALLERGIES:  penicillin (Anaphylaxis; Hives)      MEDICATIONS:  MEDICATIONS  (STANDING):  aspirin  chewable 81 milliGRAM(s) Oral daily  atorvastatin 80 milliGRAM(s) Oral at bedtime  clopidogrel Tablet 75 milliGRAM(s) Oral daily  dextrose 5%. 1000 milliLiter(s) (50 mL/Hr) IV Continuous <Continuous>  dextrose 50% Injectable 12.5 Gram(s) IV Push once  dextrose 50% Injectable 25 Gram(s) IV Push once  dextrose 50% Injectable 25 Gram(s) IV Push once  enalapril 2.5 milliGRAM(s) Oral daily  enoxaparin Injectable 60 milliGRAM(s) SubCutaneous every 12 hours  insulin glargine Injectable (LANTUS) 9 Unit(s) SubCutaneous at bedtime  insulin lispro (HumaLOG) corrective regimen sliding scale   SubCutaneous three times a day before meals  insulin lispro Injectable (HumaLOG) 3 Unit(s) SubCutaneous three times a day before meals    MEDICATIONS  (PRN):  dextrose 40% Gel 15 Gram(s) Oral once PRN Blood Glucose LESS THAN 70 milliGRAM(s)/deciliter  glucagon  Injectable 1 milliGRAM(s) IntraMuscular once PRN Glucose LESS THAN 70 milligrams/deciliter      HOME MEDICATIONS:  Home Medications:  clopidogrel 75 mg oral tablet: 1 tab(s) orally once a day (03 Jul 2018 18:03)  enalapril 2.5 mg oral tablet: 1 tab(s) orally once a day (03 Jul 2018 18:03)  glipiZIDE 5 mg oral tablet: 1 tab(s) orally every 12 hours (03 Jul 2018 18:03)  metFORMIN 500 mg oral tablet: 1 tab(s) orally 2 times a day (03 Jul 2018 18:03)  simvastatin 20 mg oral tablet: 1 tab(s) orally once a day (at bedtime) (03 Jul 2018 18:03)      VITALS:   T(F): 97.2 (07-03 @ 23:54), Max: 99.1 (07-03 @ 13:54)  HR: 73 (07-03 @ 23:54) (70 - 79)  BP: 108/54 (07-03 @ 23:54) (108/54 - 154/68)  BP(mean): --  RR: 18 (07-03 @ 23:54) (18 - 18)  SpO2: 99% (07-03 @ 19:30) (98% - 100%)    I&O's Summary      PHYSICAL EXAM:  GEN: Alert and oriented X 3, Well nourished, No acute distress, +hearing impairment  NECK: Supple, no JVD  LUNGS: Clear to auscultation bilaterally  CARDIOVASCULAR: S1/S2 regular , no murmus or rubs  ABD: Soft, non-tender  EXT: No Lower extreimity edema/cyanosis  NEURO: Non focal  SKIN: Intact    LABS:                        12.6   9.63  )-----------( 309      ( 03 Jul 2018 14:01 )             37.3     07-03    136  |  99  |  17  ----------------------------<  58<L>  4.4   |  21  |  0.9    Ca    9.6      03 Jul 2018 14:01    TPro  6.8  /  Alb  4.6  /  TBili  0.4  /  DBili  x   /  AST  15  /  ALT  9   /  AlkPhos  48  07-03    PT/INR - ( 03 Jul 2018 14:01 )   PT: 12.20 sec;   INR: 1.13 ratio         PTT - ( 03 Jul 2018 14:01 )  PTT:31.8 sec  Lactate, Blood: 1.3 mmol/L (07-03-18 @ 14:01)  Creatine Kinase, Serum: 181 U/L (07-03-18 @ 14:01)  Troponin T, Serum: <0.01 ng/mL (07-03-18 @ 14:01)    CARDIAC MARKERS ( 03 Jul 2018 14:01 )  x     / <0.01 ng/mL / 181 U/L / x     / 1.8 ng/mL        Troponin trend:        Hemoglobin A1C   Thyroid      RADIOLOGY:  -CXR:  -TTE:  -CCTA:  -STRESS TEST:  -CATHETERIZATION:    ECG:  < from: 12 Lead ECG (07.03.18 @ 16:29) >  Diagnosis Line Normal sinus rhythm  Normal ECG      TELEMETRY EVENTS: HPI:  88 year old female with PMH of hypertension, hyperlipidemia, diabetes, and newly diagnosed atrial fibrillation (as per pt's son, diagnosed last month, on holter monitor) presents for aphasia of sudden onset. Last known normal at 12:20 yesterday, her daughter spoke to her at 12:50 and patient was having difficulty with her words.  EMS was activated and en route some improvement was noted.  Patient did not have weakness or numbness and has two prior TIA episodes involving transient aphasia one in oct 2015 and another in Jan 2016.    pt denies any chest pain/palpitations/dyspnea/fever/syncope.    ROS:  All other systems reviewed and are negative    PAST MEDICAL & SURGICAL HISTORY  Atrial fibrillation  TIA (transient ischemic attack)  Diabetes mellitus  Hyperlipidemia  Hypertension      FAMILY HISTORY:  FAMILY HISTORY:      SOCIAL HISTORY:  []smoker  []Alcohol  []Drug    ALLERGIES:  penicillin (Anaphylaxis; Hives)      MEDICATIONS:  MEDICATIONS  (STANDING):  aspirin  chewable 81 milliGRAM(s) Oral daily  atorvastatin 80 milliGRAM(s) Oral at bedtime  clopidogrel Tablet 75 milliGRAM(s) Oral daily  dextrose 5%. 1000 milliLiter(s) (50 mL/Hr) IV Continuous <Continuous>  dextrose 50% Injectable 12.5 Gram(s) IV Push once  dextrose 50% Injectable 25 Gram(s) IV Push once  dextrose 50% Injectable 25 Gram(s) IV Push once  enalapril 2.5 milliGRAM(s) Oral daily  enoxaparin Injectable 60 milliGRAM(s) SubCutaneous every 12 hours  insulin glargine Injectable (LANTUS) 9 Unit(s) SubCutaneous at bedtime  insulin lispro (HumaLOG) corrective regimen sliding scale   SubCutaneous three times a day before meals  insulin lispro Injectable (HumaLOG) 3 Unit(s) SubCutaneous three times a day before meals    MEDICATIONS  (PRN):  dextrose 40% Gel 15 Gram(s) Oral once PRN Blood Glucose LESS THAN 70 milliGRAM(s)/deciliter  glucagon  Injectable 1 milliGRAM(s) IntraMuscular once PRN Glucose LESS THAN 70 milligrams/deciliter      HOME MEDICATIONS:  Home Medications:  clopidogrel 75 mg oral tablet: 1 tab(s) orally once a day (03 Jul 2018 18:03)  enalapril 2.5 mg oral tablet: 1 tab(s) orally once a day (03 Jul 2018 18:03)  glipiZIDE 5 mg oral tablet: 1 tab(s) orally every 12 hours (03 Jul 2018 18:03)  metFORMIN 500 mg oral tablet: 1 tab(s) orally 2 times a day (03 Jul 2018 18:03)  simvastatin 20 mg oral tablet: 1 tab(s) orally once a day (at bedtime) (03 Jul 2018 18:03)      VITALS:   T(F): 97.2 (07-03 @ 23:54), Max: 99.1 (07-03 @ 13:54)  HR: 73 (07-03 @ 23:54) (70 - 79)  BP: 108/54 (07-03 @ 23:54) (108/54 - 154/68)  BP(mean): --  RR: 18 (07-03 @ 23:54) (18 - 18)  SpO2: 99% (07-03 @ 19:30) (98% - 100%)    I&O's Summary      PHYSICAL EXAM:  GEN: Alert and oriented X 3, Well nourished, No acute distress, +hearing impairment  NECK: Supple, no JVD  LUNGS: Clear to auscultation bilaterally  CARDIOVASCULAR: S1/S2 regular , no murmus or rubs  ABD: Soft, non-tender  EXT: No Lower extreimity edema/cyanosis  NEURO: Non focal  SKIN: Intact    LABS:                        12.6   9.63  )-----------( 309      ( 03 Jul 2018 14:01 )             37.3     07-03    136  |  99  |  17  ----------------------------<  58<L>  4.4   |  21  |  0.9    Ca    9.6      03 Jul 2018 14:01    TPro  6.8  /  Alb  4.6  /  TBili  0.4  /  DBili  x   /  AST  15  /  ALT  9   /  AlkPhos  48  07-03    PT/INR - ( 03 Jul 2018 14:01 )   PT: 12.20 sec;   INR: 1.13 ratio         PTT - ( 03 Jul 2018 14:01 )  PTT:31.8 sec  Lactate, Blood: 1.3 mmol/L (07-03-18 @ 14:01)  Creatine Kinase, Serum: 181 U/L (07-03-18 @ 14:01)  Troponin T, Serum: <0.01 ng/mL (07-03-18 @ 14:01)    CARDIAC MARKERS ( 03 Jul 2018 14:01 )  x     / <0.01 ng/mL / 181 U/L / x     / 1.8 ng/mL        Troponin trend:        Hemoglobin A1C   Thyroid      RADIOLOGY:  -CXR:  -TTE:  -CCTA:  -STRESS TEST:  -CATHETERIZATION:    ECG:  < from: 12 Lead ECG (07.03.18 @ 16:29) >  Diagnosis Line Normal sinus rhythm  Normal ECG      TELEMETRY EVENTS:

## 2018-07-05 LAB
ANION GAP SERPL CALC-SCNC: 16 MMOL/L — HIGH (ref 7–14)
BASOPHILS # BLD AUTO: 0.06 K/UL — SIGNIFICANT CHANGE UP (ref 0–0.2)
BASOPHILS NFR BLD AUTO: 1 % — SIGNIFICANT CHANGE UP (ref 0–1)
BUN SERPL-MCNC: 13 MG/DL — SIGNIFICANT CHANGE UP (ref 10–20)
CALCIUM SERPL-MCNC: 9.1 MG/DL — SIGNIFICANT CHANGE UP (ref 8.5–10.1)
CHLORIDE SERPL-SCNC: 101 MMOL/L — SIGNIFICANT CHANGE UP (ref 98–110)
CO2 SERPL-SCNC: 21 MMOL/L — SIGNIFICANT CHANGE UP (ref 17–32)
CREAT SERPL-MCNC: 0.9 MG/DL — SIGNIFICANT CHANGE UP (ref 0.7–1.5)
EOSINOPHIL # BLD AUTO: 0.18 K/UL — SIGNIFICANT CHANGE UP (ref 0–0.7)
EOSINOPHIL NFR BLD AUTO: 2.9 % — SIGNIFICANT CHANGE UP (ref 0–8)
GLUCOSE SERPL-MCNC: 260 MG/DL — HIGH (ref 70–99)
HCT VFR BLD CALC: 37.9 % — SIGNIFICANT CHANGE UP (ref 37–47)
HGB BLD-MCNC: 12.9 G/DL — SIGNIFICANT CHANGE UP (ref 12–16)
IMM GRANULOCYTES NFR BLD AUTO: 0.3 % — SIGNIFICANT CHANGE UP (ref 0.1–0.3)
LYMPHOCYTES # BLD AUTO: 1.12 K/UL — LOW (ref 1.2–3.4)
LYMPHOCYTES # BLD AUTO: 18.2 % — LOW (ref 20.5–51.1)
MCHC RBC-ENTMCNC: 29.9 PG — SIGNIFICANT CHANGE UP (ref 27–31)
MCHC RBC-ENTMCNC: 34 G/DL — SIGNIFICANT CHANGE UP (ref 32–37)
MCV RBC AUTO: 87.9 FL — SIGNIFICANT CHANGE UP (ref 81–99)
MONOCYTES # BLD AUTO: 0.48 K/UL — SIGNIFICANT CHANGE UP (ref 0.1–0.6)
MONOCYTES NFR BLD AUTO: 7.8 % — SIGNIFICANT CHANGE UP (ref 1.7–9.3)
NEUTROPHILS # BLD AUTO: 4.31 K/UL — SIGNIFICANT CHANGE UP (ref 1.4–6.5)
NEUTROPHILS NFR BLD AUTO: 69.8 % — SIGNIFICANT CHANGE UP (ref 42.2–75.2)
NRBC # BLD: 0 /100 WBCS — SIGNIFICANT CHANGE UP (ref 0–0)
PLATELET # BLD AUTO: 296 K/UL — SIGNIFICANT CHANGE UP (ref 130–400)
POTASSIUM SERPL-MCNC: 4.4 MMOL/L — SIGNIFICANT CHANGE UP (ref 3.5–5)
POTASSIUM SERPL-SCNC: 4.4 MMOL/L — SIGNIFICANT CHANGE UP (ref 3.5–5)
RBC # BLD: 4.31 M/UL — SIGNIFICANT CHANGE UP (ref 4.2–5.4)
RBC # FLD: 13.4 % — SIGNIFICANT CHANGE UP (ref 11.5–14.5)
SODIUM SERPL-SCNC: 138 MMOL/L — SIGNIFICANT CHANGE UP (ref 135–146)
WBC # BLD: 6.17 K/UL — SIGNIFICANT CHANGE UP (ref 4.8–10.8)
WBC # FLD AUTO: 6.17 K/UL — SIGNIFICANT CHANGE UP (ref 4.8–10.8)

## 2018-07-05 RX ORDER — APIXABAN 2.5 MG/1
2.5 TABLET, FILM COATED ORAL EVERY 12 HOURS
Qty: 0 | Refills: 0 | Status: DISCONTINUED | OUTPATIENT
Start: 2018-07-05 | End: 2018-07-06

## 2018-07-05 RX ORDER — APIXABAN 2.5 MG/1
1 TABLET, FILM COATED ORAL
Qty: 60 | Refills: 0 | OUTPATIENT
Start: 2018-07-05 | End: 2018-08-03

## 2018-07-05 RX ORDER — APIXABAN 2.5 MG/1
1 TABLET, FILM COATED ORAL
Qty: 60 | Refills: 0
Start: 2018-07-05 | End: 2018-08-03

## 2018-07-05 RX ADMIN — ATORVASTATIN CALCIUM 80 MILLIGRAM(S): 80 TABLET, FILM COATED ORAL at 21:13

## 2018-07-05 RX ADMIN — Medication 3 UNIT(S): at 18:17

## 2018-07-05 RX ADMIN — ENOXAPARIN SODIUM 60 MILLIGRAM(S): 100 INJECTION SUBCUTANEOUS at 05:23

## 2018-07-05 RX ADMIN — INSULIN GLARGINE 9 UNIT(S): 100 INJECTION, SOLUTION SUBCUTANEOUS at 22:01

## 2018-07-05 RX ADMIN — Medication 81 MILLIGRAM(S): at 12:05

## 2018-07-05 RX ADMIN — Medication 2: at 18:16

## 2018-07-05 RX ADMIN — Medication 2.5 MILLIGRAM(S): at 05:23

## 2018-07-05 RX ADMIN — Medication 3 UNIT(S): at 12:01

## 2018-07-05 RX ADMIN — APIXABAN 2.5 MILLIGRAM(S): 2.5 TABLET, FILM COATED ORAL at 18:18

## 2018-07-05 RX ADMIN — CLOPIDOGREL BISULFATE 75 MILLIGRAM(S): 75 TABLET, FILM COATED ORAL at 12:05

## 2018-07-05 NOTE — CONSULT NOTE ADULT - ASSESSMENT
IMPRESSION: Rehab of cva vs. tia    PRECAUTIONS: [  ] Cardiac  [  ] Respiratory  [  ] Seizures [  ] Contact Isolation  [  ] Droplet Isolation  [  ] Other    Weight Bearing Status:     RECOMMENDATION:    Out of Bed to Chair     DVT/Decubiti Prophylaxis    REHAB PLAN:     [ x  ] Bedside P/T 3-5 times a week   [   ]   Bedside O/T  2-3 times a week             [   ] No Rehab Therapy Indicated                   [ x  ]  Speech Therapy   Conditioning/ROM                                    ADL  Bed Mobility                                               Conditioning/ROM  Transfers                                                     Bed Mobility  Sitting /Standing Balance                         Transfers                                        Gait Training                                               Sitting/Standing Balance  Stair Training [   ]Applicable                    Home equipment Eval                                                                        Splinting  [   ] Only      GOALS:   ADL   [   ]   Independent                    Transfers  [ x  ] Independent                          Ambulation  [ x  ] Independent     [ x   ] With device                            [   ]  CG                                                         [   ]  CG                                                                  [   ] CG                            [    ] Min A                                                   [   ] Min A                                                              [   ] Min  A          DISCHARGE PLAN:   [   ]  Good candidate for Intensive Rehabilitation/Hospital based-[facility]A [facility]                                             Will tolerate 3hrs Intensive Rehab Daily                                       [    ]  Short Term Rehab in Skilled Nursing Facility                                       [  x  ]  Home with Outpatient or VN services                                         [    ]  Possible Candidate for Intensive Hospital based Rehab

## 2018-07-05 NOTE — PROGRESS NOTE ADULT - SUBJECTIVE AND OBJECTIVE BOX
ALBARO HAMILTON  88y  Female    Patient is a 88y old  Female who presents with a chief complaint of Slurred speech and difficulty speaking (03 Jul 2018 17:33)      INTERVAL HPI/OVERNIGHT EVENTS: MRI Done    T(C): 36.4 (07-05-18 @ 03:39), Max: 36.6 (07-04-18 @ 20:15)  HR: 71 (07-05-18 @ 03:39) (71 - 81)  BP: 121/58 (07-05-18 @ 03:39) (112/59 - 150/67)  RR: 18 (07-05-18 @ 03:39) (16 - 19)  SpO2: --  Wt(kg): --Vital Signs Last 24 Hrs  T(C): 36.4 (05 Jul 2018 03:39), Max: 36.6 (04 Jul 2018 20:15)  T(F): 97.6 (05 Jul 2018 03:39), Max: 97.9 (04 Jul 2018 20:15)  HR: 71 (05 Jul 2018 03:39) (71 - 81)  BP: 121/58 (05 Jul 2018 03:39) (112/59 - 150/67)  BP(mean): --  RR: 18 (05 Jul 2018 03:39) (16 - 19)  SpO2: --    PHYSICAL EXAM:  GENERAL: NAD, well-groomed, well-developed  HEAD:  Atraumatic, Normocephalic  NECK: Supple, No JVD, Normal thyroid  NERVOUS SYSTEM:  Alert & Oriented X3, Good concentration; Motor Strength 5/5 B/L upper and lower extremities; DTRs 2+ intact and symmetric  CHEST/LUNG: Clear to percussion bilaterally; No rales, rhonchi, wheezing, or rubs  HEART: Regular rate and rhythm; No murmurs, rubs, or gallops  ABDOMEN: Soft, Nontender, Nondistended; Bowel sounds present  EXTREMITIES:  2+ Peripheral Pulses, No clubbing, cyanosis, or edema    Consultant(s) Notes Reviewed:  [x ] YES  [ ] NO    Discussed with Consultants/Other Providers/Residents [ x] YES     LABS                         12.8   6.66  )-----------( 279      ( 04 Jul 2018 05:35 )             37.5     07-04    141  |  103  |  16  ----------------------------<  130<H>  5.4<H>   |  23  |  0.9    Ca    10.0      04 Jul 2018 05:35  Mg     2.5     07-04    TPro  6.8  /  Alb  4.6  /  TBili  0.4  /  DBili  x   /  AST  15  /  ALT  9   /  AlkPhos  48  07-03    PT/INR - ( 03 Jul 2018 14:01 )   PT: 12.20 sec;   INR: 1.13 ratio         PTT - ( 03 Jul 2018 14:01 )  PTT:31.8 sec      LIVER FUNCTIONS - ( 03 Jul 2018 14:01 )  Alb: 4.6 g/dL / Pro: 6.8 g/dL / ALK PHOS: 48 U/L / ALT: 9 U/L / AST: 15 U/L / GGT: x           CAPILLARY BLOOD GLUCOSE            RADIOLOGY & ADDITIONAL TESTS:    Imaging Personally Reviewed:  [x ] YES  [ ] NO    MEDICATIONS  (STANDING):  aspirin  chewable 81 milliGRAM(s) Oral daily  atorvastatin 80 milliGRAM(s) Oral at bedtime  clopidogrel Tablet 75 milliGRAM(s) Oral daily  dextrose 5%. 1000 milliLiter(s) (50 mL/Hr) IV Continuous <Continuous>  dextrose 50% Injectable 12.5 Gram(s) IV Push once  dextrose 50% Injectable 25 Gram(s) IV Push once  dextrose 50% Injectable 25 Gram(s) IV Push once  enalapril 2.5 milliGRAM(s) Oral daily  enoxaparin Injectable 60 milliGRAM(s) SubCutaneous every 12 hours  insulin glargine Injectable (LANTUS) 9 Unit(s) SubCutaneous at bedtime  insulin lispro (HumaLOG) corrective regimen sliding scale   SubCutaneous three times a day before meals  insulin lispro Injectable (HumaLOG) 3 Unit(s) SubCutaneous three times a day before meals    MEDICATIONS  (PRN):  dextrose 40% Gel 15 Gram(s) Oral once PRN Blood Glucose LESS THAN 70 milliGRAM(s)/deciliter  glucagon  Injectable 1 milliGRAM(s) IntraMuscular once PRN Glucose LESS THAN 70 milligrams/deciliter      HEALTH ISSUES - PROBLEM Dx:

## 2018-07-05 NOTE — OCCUPATIONAL THERAPY INITIAL EVALUATION ADULT - ADDITIONAL COMMENTS
pt lives in a 2 story home and goes down a full flight of stairs to basement to do laundry. Pt daughter present and verbalized they have been looking into assisted living facilities for patient to move to 2* pt is forgetful at times and performs unsafe tasks at home that can lead to a fall. pt is [place] even with hearing aids in ears.

## 2018-07-05 NOTE — PHYSICAL THERAPY INITIAL EVALUATION ADULT - LIVES WITH, PROFILE
pt lives in pvt home, has 3 steps to enter with rail, and 1 flight of steps to basement for laundry/alone

## 2018-07-05 NOTE — PROGRESS NOTE ADULT - SUBJECTIVE AND OBJECTIVE BOX
SUBJECTIVE:    Patient is a 88y old Female who presents with a chief complaint of Slurred speech and difficulty speaking (03 Jul 2018 17:33)    Currently admitted to medicine with the primary diagnosis of CVA (cerebral vascular accident)     Today is hospital day 2d.   OVERNIGHT EVENTS  Today, patient is lying comfortably in bed. Denies CP, SOB, palpitation, n/v, or pain anywhere.     PAST MEDICAL & SURGICAL HISTORY  Atrial fibrillation  TIA (transient ischemic attack)  Diabetes mellitus  Hyperlipidemia  Hypertension          ALLERGIES:  penicillin (Anaphylaxis; Hives)    MEDICATIONS:  STANDING MEDICATIONS  apixaban 2.5 milliGRAM(s) Oral every 12 hours  aspirin  chewable 81 milliGRAM(s) Oral daily  atorvastatin 80 milliGRAM(s) Oral at bedtime  dextrose 5%. 1000 milliLiter(s) IV Continuous <Continuous>  dextrose 50% Injectable 12.5 Gram(s) IV Push once  dextrose 50% Injectable 25 Gram(s) IV Push once  dextrose 50% Injectable 25 Gram(s) IV Push once  enalapril 2.5 milliGRAM(s) Oral daily  insulin glargine Injectable (LANTUS) 9 Unit(s) SubCutaneous at bedtime  insulin lispro (HumaLOG) corrective regimen sliding scale   SubCutaneous three times a day before meals  insulin lispro Injectable (HumaLOG) 3 Unit(s) SubCutaneous three times a day before meals    PRN MEDICATIONS  dextrose 40% Gel 15 Gram(s) Oral once PRN  glucagon  Injectable 1 milliGRAM(s) IntraMuscular once PRN    VITALS:   T(F): 96.4  HR: 69  BP: 108/54  RR: 16  SpO2: --          LABS:                        12.9   6.17  )-----------( 296      ( 05 Jul 2018 12:56 )             37.9     07-05    138  |  101  |  13  ----------------------------<  260<H>  4.4   |  21  |  0.9    Ca    9.1      05 Jul 2018 12:56  Mg     2.5     07-04          PHYSICAL EXAM:  GEN: NAD  LUNGS: CTAB  HEART: s1s2 present   ABD: soft nontender nondistended +BS  EXT: no edema   NEURO: aao x3, CN intact, sensory intact, motor 5/5

## 2018-07-05 NOTE — PROGRESS NOTE ADULT - SUBJECTIVE AND OBJECTIVE BOX
ALBARO HAMILTON    Chief Complaint: Expressive aphasia.    Right Handed    HPI:  88 year old woman with a past medical history of hypertension, hyperlipidemia, diabetes, and newly diagnosed atrial fibrillation presents for aphasia of sudden onset. She has suffered two prior episodes of transient aphasia one in oct 2015 and another in 2016.functional.  She has returned to her baseline and MRI brain did not reveal an acute ischemic stroke.    Relevant PMH:  [] Prior ischemic stroke/TIA  [x] Afib  []CAD  [x]HTN  [x]DLD  [x]DM []PVD []Obesity [] Sedintary lifestyle []CHF  []TORY  []Cancer Hx     Social History: [] Smoking []  Drug Use: []   Alcohol Use:   [] Other:      Possible Location of Stroke:  No ischemic change on MRI brain.  Possible Cause of Stroke:  Likelyhood of an embolic event several times in the same vascular territory is low. Episodes due to flow related or small vessel disease is more likely.   Relevant Cervicocerebral Imaging:  MRA head and neck unremarkable.  Fetal L PCA    Relevant blood tests:  Direct LDL: 85 mg/dL [4 - 129] (18 @ 05:35)  Hemoglobin A1C, Whole Blood: 6.8 % (18 @ 05:35)      Relevant cardiac rhythm monitorin hours on telemetry monitoring, known afib.  Relevant Cardiac Structure:(TTE/JENNIFER +/-):No intracardiac thrombus/vegetation/akynesia/EF:  TTE report in chart.    Home Medications:  clopidogrel 75 mg oral tablet: 1 tab(s) orally once a day (2018 18:03)  enalapril 2.5 mg oral tablet: 1 tab(s) orally once a day (2018 18:03)  glipiZIDE 5 mg oral tablet: 1 tab(s) orally every 12 hours (2018 18:03)  metFORMIN 500 mg oral tablet: 1 tab(s) orally 2 times a day (2018 18:03)  simvastatin 20 mg oral tablet: 1 tab(s) orally once a day (at bedtime) (2018 18:03)      MEDICATIONS  (STANDING):  aspirin  chewable 81 milliGRAM(s) Oral daily  atorvastatin 80 milliGRAM(s) Oral at bedtime  clopidogrel Tablet 75 milliGRAM(s) Oral daily  dextrose 5%. 1000 milliLiter(s) (50 mL/Hr) IV Continuous <Continuous>  dextrose 50% Injectable 12.5 Gram(s) IV Push once  dextrose 50% Injectable 25 Gram(s) IV Push once  dextrose 50% Injectable 25 Gram(s) IV Push once  enalapril 2.5 milliGRAM(s) Oral daily  enoxaparin Injectable 60 milliGRAM(s) SubCutaneous every 12 hours  insulin glargine Injectable (LANTUS) 9 Unit(s) SubCutaneous at bedtime  insulin lispro (HumaLOG) corrective regimen sliding scale   SubCutaneous three times a day before meals  insulin lispro Injectable (HumaLOG) 3 Unit(s) SubCutaneous three times a day before meals      PT/OT/Speech/Rehab/S&Swr:pending    Exam:    Vital Signs Last 24 Hrs  T(C): 35.8 (2018 08:57), Max: 36.6 (2018 20:15)  T(F): 96.4 (2018 08:57), Max: 97.9 (2018 20:15)  HR: 69 (2018 08:57) (69 - 81)  BP: 108/54 (2018 08:57) (108/54 - 150/67)  BP(mean): --  RR: 16 (2018 08:57) (16 - 19)  SpO2: --    NIHSS      LOC:       1a:    0 1b(Questions): 0        1c(Instructions):   0          Best Gaze:0  Visual:0  Motor:                 RUE:   0  RLE: 0    LUE:  0   LLE:    0 FACE:  0   Limb Ataxia:0  Sensory:     0  Language:     0  Dysarthria:        0  Extinction and Inattention:0    NIHSS on admission:   1       NIHSS yesterday:  0        NIHSS today:     0        m-RS:0    Impression:  88 year old woman with a past medical history of hypertension, hyperlipidemia, diabetes, and newly diagnosed atrial fibrillation presents for aphasia of sudden onset. She has suffered two prior episodes of transient aphasia one in oct 2015 and another in 2016. She has returned to her baseline and MRI brain did not reveal an acute ischemic stroke. Due to the multiplicity of similar events involving the same vascular territory, the likelyhood of an embolic event is low, the events are more likely due to small vessel disease or flow related cause.    Suggestion:  From a stroke perspective may continue anticoagulation but from a stroke perspective if she is on anticoagulation she does not require ASA and Plavix.  Glycemic control.  Statin management by medical team.  F/u TTE.  PT OT Rehab Speech.  Disposition:  Follow up with the stroke clinic in 2-4 weeks.      Eric Sanchez NP  [phone] ALBARO HAMILTON    Chief Complaint: Expressive aphasia.    Right Handed    HPI:  88 year old woman with a past medical history of hypertension, hyperlipidemia, diabetes, and newly diagnosed atrial fibrillation presents for aphasia of sudden onset. She has suffered two prior episodes of transient aphasia one in oct 2015 and another in 2016.functional.  She has returned to her baseline and MRI brain did not reveal an acute ischemic stroke.    Relevant PMH:  [] Prior ischemic stroke/TIA  [x] Afib  []CAD  [x]HTN  [x]DLD  [x]DM []PVD []Obesity [] Sedintary lifestyle []CHF  []TORY  []Cancer Hx     Social History: [] Smoking []  Drug Use: []   Alcohol Use:   [] Other:      Possible Location of Stroke:  No ischemic change on MRI brain.  Possible Cause of Stroke:  Likelyhood of an embolic event several times in the same vascular territory is low. Episodes due to flow related or small vessel disease is more likely.   Relevant Cervicocerebral Imaging:  MRA head and neck unremarkable.  Fetal L PCA    Relevant blood tests:  Direct LDL: 85 mg/dL [4 - 129] (18 @ 05:35)  Hemoglobin A1C, Whole Blood: 6.8 % (18 @ 05:35)      Relevant cardiac rhythm monitorin hours on telemetry monitoring, known afib.  Relevant Cardiac Structure:(TTE/JENNIFER +/-):No intracardiac thrombus/vegetation/akynesia/EF:  TTE report in chart.    Home Medications:  clopidogrel 75 mg oral tablet: 1 tab(s) orally once a day (2018 18:03)  enalapril 2.5 mg oral tablet: 1 tab(s) orally once a day (2018 18:03)  glipiZIDE 5 mg oral tablet: 1 tab(s) orally every 12 hours (2018 18:03)  metFORMIN 500 mg oral tablet: 1 tab(s) orally 2 times a day (2018 18:03)  simvastatin 20 mg oral tablet: 1 tab(s) orally once a day (at bedtime) (2018 18:03)      MEDICATIONS  (STANDING):  aspirin  chewable 81 milliGRAM(s) Oral daily  atorvastatin 80 milliGRAM(s) Oral at bedtime  clopidogrel Tablet 75 milliGRAM(s) Oral daily  dextrose 5%. 1000 milliLiter(s) (50 mL/Hr) IV Continuous <Continuous>  dextrose 50% Injectable 12.5 Gram(s) IV Push once  dextrose 50% Injectable 25 Gram(s) IV Push once  dextrose 50% Injectable 25 Gram(s) IV Push once  enalapril 2.5 milliGRAM(s) Oral daily  enoxaparin Injectable 60 milliGRAM(s) SubCutaneous every 12 hours  insulin glargine Injectable (LANTUS) 9 Unit(s) SubCutaneous at bedtime  insulin lispro (HumaLOG) corrective regimen sliding scale   SubCutaneous three times a day before meals  insulin lispro Injectable (HumaLOG) 3 Unit(s) SubCutaneous three times a day before meals      PT/OT/Speech/Rehab/S&Swr:pending    Exam:    Vital Signs Last 24 Hrs  T(C): 35.8 (2018 08:57), Max: 36.6 (2018 20:15)  T(F): 96.4 (2018 08:57), Max: 97.9 (2018 20:15)  HR: 69 (2018 08:57) (69 - 81)  BP: 108/54 (2018 08:57) (108/54 - 150/67)  BP(mean): --  RR: 16 (2018 08:57) (16 - 19)  SpO2: --    NIHSS      LOC:       1a:    0 1b(Questions): 0        1c(Instructions):   0          Best Gaze:0  Visual:0  Motor:                 RUE:   0  RLE: 0    LUE:  0   LLE:    0 FACE:  0   Limb Ataxia:0  Sensory:     0  Language:     0  Dysarthria:        0  Extinction and Inattention:0    NIHSS on admission:   1       NIHSS yesterday:  0        NIHSS today:     0        m-RS:0    Impression:  88 year old woman with a past medical history of hypertension, hyperlipidemia, diabetes, and newly diagnosed atrial fibrillation presents for aphasia of sudden onset. She has suffered two prior episodes of transient aphasia one in oct 2015 and another in 2016. She has returned to her baseline and MRI brain did not reveal an acute ischemic stroke. Due to the multiplicity of similar events involving the same vascular territory, the likelyhood of an embolic event is low, the events are more likely due to small vessel disease or flow related cause.    Suggestion:  May continue anticoagulation but from a stroke perspective if she is on anticoagulation she does not require ASA and Plavix.  Glycemic control.  Statin management by medical team.  F/u TTE.  PT OT Rehab Speech.  Disposition:  Follow up with the stroke clinic in 2-4 weeks.      - We spent more than 60 minutes to review the chart, gather necessary information, evaluate the patient and discuss the case with primary team and counseling the family to their satisfaction.  Eric Sanchez NP Neurovascular/Stroke  [phone]    I, Oracio Olson, examined the patient and discussed this case, including plan of management with the Stroke team.

## 2018-07-05 NOTE — PHYSICAL THERAPY INITIAL EVALUATION ADULT - ACTIVE RANGE OF MOTION EXAMINATION, REHAB EVAL
galdino. upper extremity Active ROM was WNL (within normal limits)/bilateral lower extremity Active ROM was WNL (within normal limits)

## 2018-07-05 NOTE — CONSULT NOTE ADULT - SUBJECTIVE AND OBJECTIVE BOX
HPI:  88 year old female with hypertension, hyperlipidemia, diabetes, and newly diagnosed atrial fibrillation presents for aphasia of sudden onset.  Last known normal at 12:20 her daughter spoke to her at 12:50 and patient was having difficulty with her words.  EMS was activated and en route  some improvement was noted.  Patient did not have weakness or numbness and has two prior TIA episodes involving transient aphasia one in oct 2015   and another in Jan 2016.   Pt is baseline fully functional.  Family reports she lives alone and maintains all her ADL's.  She is forgetful of appointments at times but she is oriented to person, place, time and situation mostly.  No fever, no chills, no headache, no nausea, no vomiting, no chest pain, no back pain, no abdominal pain. No focal deficits.  No blurry vision.  No changes in vision, muscle weakness or sensory deficits. (03 Jul 2018 17:33)      PAST MEDICAL & SURGICAL HISTORY:  Atrial fibrillation  TIA (transient ischemic attack)  Diabetes mellitus  Hyperlipidemia  Hypertension      Hospital Course:    TODAY'S SUBJECTIVE & REVIEW OF SYMPTOMS:     Constitutional WNL   Cardio WNL   Resp WNL   GI WNL  Heme WNL  Endo WNL  Skin WNL  MSK WNL  Neuro WNL  Cognitive WNL  Psych WNL      MEDICATIONS  (STANDING):  aspirin  chewable 81 milliGRAM(s) Oral daily  atorvastatin 80 milliGRAM(s) Oral at bedtime  clopidogrel Tablet 75 milliGRAM(s) Oral daily  dextrose 5%. 1000 milliLiter(s) (50 mL/Hr) IV Continuous <Continuous>  dextrose 50% Injectable 12.5 Gram(s) IV Push once  dextrose 50% Injectable 25 Gram(s) IV Push once  dextrose 50% Injectable 25 Gram(s) IV Push once  enalapril 2.5 milliGRAM(s) Oral daily  enoxaparin Injectable 60 milliGRAM(s) SubCutaneous every 12 hours  insulin glargine Injectable (LANTUS) 9 Unit(s) SubCutaneous at bedtime  insulin lispro (HumaLOG) corrective regimen sliding scale   SubCutaneous three times a day before meals  insulin lispro Injectable (HumaLOG) 3 Unit(s) SubCutaneous three times a day before meals    MEDICATIONS  (PRN):  dextrose 40% Gel 15 Gram(s) Oral once PRN Blood Glucose LESS THAN 70 milliGRAM(s)/deciliter  glucagon  Injectable 1 milliGRAM(s) IntraMuscular once PRN Glucose LESS THAN 70 milligrams/deciliter      FAMILY HISTORY:      Allergies    penicillin (Anaphylaxis; Hives)    Intolerances        SOCIAL HISTORY:    [  ] Etoh  [  ] Smoking  [  ] Substance abuse     Home Environment:  [x  ] Home Alone  [  ] Lives with Family  [  ] Home Health Aid    Dwelling:  [  ] Apartment  [ x ] Private House  [  ] Adult Home  [  ] Skilled Nursing Facility      [  ] Short Term  [  ] Long Term  [x  ] Stairs       Elevator [  ]    FUNCTIONAL STATUS PTA: (Check all that apply)  Ambulation: [ x  ]Independent    [  ] Dependent     [  ] Non-Ambulatory  Assistive Device: [  ] SA Cane  [  ]  Q Cane  [  ] Walker  [  ]  Wheelchair  ADL : [x  ] Independent  [  ]  Dependent       Vital Signs Last 24 Hrs  T(C): 35.8 (05 Jul 2018 08:57), Max: 36.6 (04 Jul 2018 20:15)  T(F): 96.4 (05 Jul 2018 08:57), Max: 97.9 (04 Jul 2018 20:15)  HR: 69 (05 Jul 2018 08:57) (69 - 81)  BP: 108/54 (05 Jul 2018 08:57) (108/54 - 150/67)  BP(mean): --  RR: 16 (05 Jul 2018 08:57) (16 - 19)  SpO2: --      PHYSICAL EXAM: Alert & Oriented X3  GENERAL: NAD, well-groomed, well-developed  HEAD:  Atraumatic, Normocephalic  CHEST/LUNG: Clear   HEART: Regular   ABDOMEN: Soft, Nontender  EXTREMITIES:  no calf tenderness    NERVOUS SYSTEM:  Cranial Nerves 2-12 intact [  ] Abnormal  [  ]  ROM: WFL all extremities [x  ]  Abnormal [  ]  Motor Strength: WFL all extremities  [x  ]  Abnormal [  ]  Sensation: intact to light touch [ x ] Abnormal [  ]  Reflexes: Symmetric [  ]  Abnormal [  ]    FUNCTIONAL STATUS:  Bed Mobility: Independent [  ]  Supervision [ x ]  Needs Assistance [  ]  N/A [  ]  Transfers: Independent [  ]  Supervision [x  ]  Needs Assistance [  ]  N/A [  ]   Ambulation: Independent [  ]  Supervision [  ]  Needs Assistance [x  ]  N/A [  ]  ADL: Independent [  ] Requires Assistance [  ] N/A [  ]      LABS:                        12.8   6.66  )-----------( 279      ( 04 Jul 2018 05:35 )             37.5     07-04    141  |  103  |  16  ----------------------------<  130<H>  5.4<H>   |  23  |  0.9    Ca    10.0      04 Jul 2018 05:35  Mg     2.5     07-04    TPro  6.8  /  Alb  4.6  /  TBili  0.4  /  DBili  x   /  AST  15  /  ALT  9   /  AlkPhos  48  07-03    PT/INR - ( 03 Jul 2018 14:01 )   PT: 12.20 sec;   INR: 1.13 ratio         PTT - ( 03 Jul 2018 14:01 )  PTT:31.8 sec      RADIOLOGY & ADDITIONAL STUDIES:    Assesment:

## 2018-07-05 NOTE — OCCUPATIONAL THERAPY INITIAL EVALUATION ADULT - REHAB POTENTIAL, OT EVAL
pt able to perform Activities of daily living with sup and does not require skilled OT Tx at this time. Strong recommendation made to pt and daughter to have supervision provided during IADLs, and/or move to an assisted living facility

## 2018-07-05 NOTE — PROGRESS NOTE ADULT - ASSESSMENT
88 year old female with PMHx significant for DM, HTN, DLD and history of TIA's presents due to new onset slurred speech and difficulty articulating. CT head was negative for acute stroke. She was offered tPA but pt's family refused. NIHSS 0 today.     1. TIA due to small vessel disease   - MRI brain and MRA head noncontrast, MRA neck with contrast all negative.   - Spoke with Dr. Bal (pt's cardiologist), c/w aspirin 81 and statin, discontinue plavix and Lovenox. Start on Eliquis 2.5 mg since pt age >80 and weight < 60kg.   - hemoglobin a1c 6.8, ldl 85  - PT/OT/Speech therapy eval complete   - anticipate patient tomorrow, pt has mild dementia, worsens at night. Spoke with her PMD Dr. Elmore.    2. Hx of Atrial Fibrillation  - Started on eliquis 2.5 mg as discussed above.   - f/u with Dr. Bal outpatient.    3.  HTN  - c/w enalapril    4. HLD  - atorvastatin 80 mg    5.  DM  - continue home meds outpatient     Nutrition: DASH/ TLC/ Carb consistent    #Dispo: from home    #Code Status: Full .

## 2018-07-06 ENCOUNTER — TRANSCRIPTION ENCOUNTER (OUTPATIENT)
Age: 83
End: 2018-07-06

## 2018-07-06 VITALS
RESPIRATION RATE: 18 BRPM | SYSTOLIC BLOOD PRESSURE: 99 MMHG | TEMPERATURE: 98 F | HEART RATE: 68 BPM | DIASTOLIC BLOOD PRESSURE: 46 MMHG

## 2018-07-06 LAB — TSH SERPL-MCNC: 2.08 UIU/ML — SIGNIFICANT CHANGE UP (ref 0.27–4.2)

## 2018-07-06 RX ORDER — CLOPIDOGREL BISULFATE 75 MG/1
1 TABLET, FILM COATED ORAL
Qty: 0 | Refills: 0 | COMMUNITY

## 2018-07-06 RX ADMIN — APIXABAN 2.5 MILLIGRAM(S): 2.5 TABLET, FILM COATED ORAL at 05:54

## 2018-07-06 RX ADMIN — Medication 3 UNIT(S): at 09:16

## 2018-07-06 RX ADMIN — Medication 2: at 12:46

## 2018-07-06 RX ADMIN — Medication 3 UNIT(S): at 12:45

## 2018-07-06 RX ADMIN — Medication 2.5 MILLIGRAM(S): at 05:54

## 2018-07-06 RX ADMIN — Medication 81 MILLIGRAM(S): at 12:49

## 2018-07-06 NOTE — SWALLOW BEDSIDE ASSESSMENT ADULT - SLP GENERAL OBSERVATIONS
Pt awake and alert with no c/o pain, +Stillaguamish. Pts daughter at bedside
in bed, awake and alert. cooperative.

## 2018-07-06 NOTE — SWALLOW BEDSIDE ASSESSMENT ADULT - SLP PERTINENT HISTORY OF CURRENT PROBLEM
MRI brain: extensive periventricular and subcortical white matter, B/L bonita chronic small vessel ischemic changes, No acute pathology

## 2018-07-06 NOTE — DISCHARGE NOTE ADULT - PATIENT PORTAL LINK FT
You can access the [facility], offered by [facility], by registering with the following website: [email or web address]

## 2018-07-06 NOTE — PROGRESS NOTE ADULT - SUBJECTIVE AND OBJECTIVE BOX
ALBARO HAMILTON  88y  Female    Patient is a 88y old  Female who presents with a chief complaint of Slurred speech and difficulty speaking (03 Jul 2018 17:33)      INTERVAL HPI/OVERNIGHT EVENTS: None    T(C): 36.3 (07-06-18 @ 00:06), Max: 36.3 (07-06-18 @ 00:06)  HR: 72 (07-06-18 @ 00:06) (69 - 77)  BP: 96/64 (07-06-18 @ 00:06) (96/64 - 118/62)  RR: 18 (07-06-18 @ 00:06) (16 - 18)  SpO2: --  Wt(kg): --Vital Signs Last 24 Hrs  T(C): 36.3 (06 Jul 2018 00:06), Max: 36.3 (06 Jul 2018 00:06)  T(F): 97.3 (06 Jul 2018 00:06), Max: 97.3 (06 Jul 2018 00:06)  HR: 72 (06 Jul 2018 00:06) (69 - 77)  BP: 96/64 (06 Jul 2018 00:06) (96/64 - 118/62)  BP(mean): --  RR: 18 (06 Jul 2018 00:06) (16 - 18)  SpO2: --    PHYSICAL EXAM:  GENERAL: NAD, well-groomed, well-developed  HEAD:  Atraumatic, Normocephalic  NECK: Supple, No JVD, Normal thyroid  NERVOUS SYSTEM:  Alert & Oriented X3, Good concentration; Motor Strength 5/5 B/L upper and lower extremities; DTRs 2+ intact and symmetric  CHEST/LUNG: Clear to percussion bilaterally; No rales, rhonchi, wheezing, or rubs  HEART: Regular rate and rhythm; No murmurs, rubs, or gallops  ABDOMEN: Soft, Nontender, Nondistended; Bowel sounds present  EXTREMITIES:  2+ Peripheral Pulses, No clubbing, cyanosis, or edema    Consultant(s) Notes Reviewed:  [x ] YES  [ ] NO    Discussed with Consultants/Other Providers/Residents [ x] YES     LABS                         12.9   6.17  )-----------( 296      ( 05 Jul 2018 12:56 )             37.9     07-05    138  |  101  |  13  ----------------------------<  260<H>  4.4   |  21  |  0.9    Ca    9.1      05 Jul 2018 12:56  Mg     2.5     07-04              CAPILLARY BLOOD GLUCOSE            RADIOLOGY & ADDITIONAL TESTS:    Imaging Personally Reviewed:  [x ] YES  [ ] NO    MEDICATIONS  (STANDING):  apixaban 2.5 milliGRAM(s) Oral every 12 hours  aspirin  chewable 81 milliGRAM(s) Oral daily  atorvastatin 80 milliGRAM(s) Oral at bedtime  dextrose 5%. 1000 milliLiter(s) (50 mL/Hr) IV Continuous <Continuous>  dextrose 50% Injectable 12.5 Gram(s) IV Push once  dextrose 50% Injectable 25 Gram(s) IV Push once  dextrose 50% Injectable 25 Gram(s) IV Push once  enalapril 2.5 milliGRAM(s) Oral daily  insulin glargine Injectable (LANTUS) 9 Unit(s) SubCutaneous at bedtime  insulin lispro (HumaLOG) corrective regimen sliding scale   SubCutaneous three times a day before meals  insulin lispro Injectable (HumaLOG) 3 Unit(s) SubCutaneous three times a day before meals    MEDICATIONS  (PRN):  dextrose 40% Gel 15 Gram(s) Oral once PRN Blood Glucose LESS THAN 70 milliGRAM(s)/deciliter  glucagon  Injectable 1 milliGRAM(s) IntraMuscular once PRN Glucose LESS THAN 70 milligrams/deciliter      HEALTH ISSUES - PROBLEM Dx:

## 2018-07-06 NOTE — DISCHARGE NOTE ADULT - CARE PLAN
Principal Discharge DX:	TIA (transient ischemic attack)  Goal:	Resolution  Assessment and plan of treatment:	Resolved.  You had difficulty speaking so we admitted you for possible TIA (transient ischemic attack).  All the imaging such ct and mri did not show any evidence of a stroke.  Your symptoms have resolved.  But it is good to control your risk factors such as atrial fibrillation.  Secondary Diagnosis:	Atrial fibrillation  Goal:	Resolution  Assessment and plan of treatment:	Please start taking Eliquis 2.5 mg twice a day.  Follow up with Dr. Cantu.  Please stop the plavix.

## 2018-07-06 NOTE — PROGRESS NOTE ADULT - ATTENDING COMMENTS
88 year old female with PMHx significant for DM, HTN, DLD and history of TIA's presents due to new onset slurred speech and difficulty articulating that started today at 12:50.  Pt was NIHSS 1 for performing one task correctly.  CT head was negative for acute stroke.  Pt symptoms continued to improve.  She was offered tPA but pt's family refused.      1. TIA         Brain MRI / MRA  noncontrast; MRA neck with contrast-= all neg     c/w aspirin,  atorvastatin Eliquis     f/u hemoglobin a1c, lipid profile     PT therapy evaluation appreciated- dc home with PT     Neuro f/u    2. Hx of Atrial Fibrillation     Chads vasc 6       c/w  Eliquis per cardio    3.  HTN      c/w enalapril    4. HLD    start atorvastatin 80 mg    5.  DM     c/w ISS       Nutrition: DASH/ TLC/ Carb consistent  Prophylaxis: Full AC      #Dispo: from home
88 year old female with PMHx significant for DM, HTN, DLD and history of TIA's presents due to new onset slurred speech and difficulty articulating that started today at 12:50.  Pt was NIHSS 1 for performing one task correctly.  CT head was negative for acute stroke.  Pt symptoms continued to improve.  She was offered tPA but pt's family refused.      1. TIA       Brain MRI brain and MRA head noncontrast, MRA neck with contrast.     c/w aspirin, plavix, atorvastatin Lovenox     f/u hemoglobin a1c, lipid profile   PT/OT/Speech therapy evaluation    2. Hx of Atrial Fibrillation     Chads vasc 6       c/w  lovenox       cardio eval asppreciated    3.  HTN      c/w enalapril    4. HLD    start atorvastatin 80 mg    5.  DM     c/w ISS       Nutrition: DASH/ TLC/ Carb consistent  Prophylaxis: Full AC with lovenox    #Dispo: from home    #Code Status: Full
88 year old female with PMHx significant for DM, HTN, DLD and history of TIA's presents due to new onset slurred speech and difficulty articulating that started today at 12:50.  Pt was NIHSS 1 for performing one task correctly.  CT head was negative for acute stroke.  Pt symptoms continued to improve.  She was offered tPA but pt's family refused.      1. TIA       f/u reports for Brain MRI  MRA head noncontrast; MRA neck with contrast     c/w aspirin, plavix, atorvastatin Lovenox     f/u hemoglobin a1c, lipid profile     PT/OT/Speech therapy evaluation     Neuro f/u    2. Hx of Atrial Fibrillation     Chads vasc 6       c/w  lovenox       cardio f/u    3.  HTN      c/w enalapril    4. HLD    start atorvastatin 80 mg    5.  DM     c/w ISS       Nutrition: DASH/ TLC/ Carb consistent  Prophylaxis: Full AC with lovenox    #Dispo: from home    #Code Status: Full.

## 2018-07-06 NOTE — DISCHARGE NOTE ADULT - CARE PROVIDER_API CALL
Fred Cantu), Cardiovascular Disease; Internal Medicine  [address] [place] [address]  Phone: [phone]  Fax: [phone]

## 2018-07-06 NOTE — DISCHARGE NOTE ADULT - MEDICATION SUMMARY - MEDICATIONS TO TAKE
I will START or STAY ON the medications listed below when I get home from the hospital:    aspirin 81 mg oral tablet  -- 1 tab(s) by mouth once a day  -- Indication: For TIA (transient ischemic attack)    enalapril 2.5 mg oral tablet  -- 1 tab(s) by mouth once a day  -- Indication: For HTN    Eliquis 2.5 mg oral tablet  -- 1 tab(s) by mouth 2 times a day   -- Check with your doctor before becoming pregnant.  It is very important that you take or use this exactly as directed.  Do not skip doses or discontinue unless directed by your doctor.  Obtain medical advice before taking any non-prescription drugs as some may affect the action of this medication.    -- Indication: For TIA/ Atrial Fibrillation    metFORMIN 500 mg oral tablet  -- 1 tab(s) by mouth 2 times a day  -- Indication: For DM    glipiZIDE 5 mg oral tablet  -- 1 tab(s) by mouth every 12 hours  -- Indication: For DM    simvastatin 20 mg oral tablet  -- 1 tab(s) by mouth once a day (at bedtime)  -- Indication: For TIA (transient ischemic attack)

## 2018-07-16 DIAGNOSIS — Z88.0 ALLERGY STATUS TO PENICILLIN: ICD-10-CM

## 2018-07-16 DIAGNOSIS — I35.0 NONRHEUMATIC AORTIC (VALVE) STENOSIS: ICD-10-CM

## 2018-07-16 DIAGNOSIS — I48.91 UNSPECIFIED ATRIAL FIBRILLATION: ICD-10-CM

## 2018-07-16 DIAGNOSIS — R47.81 SLURRED SPEECH: ICD-10-CM

## 2018-07-16 DIAGNOSIS — R40.2362 COMA SCALE, BEST MOTOR RESPONSE, OBEYS COMMANDS, AT ARRIVAL TO EMERGENCY DEPARTMENT: ICD-10-CM

## 2018-07-16 DIAGNOSIS — I67.89 OTHER CEREBROVASCULAR DISEASE: ICD-10-CM

## 2018-07-16 DIAGNOSIS — Z79.02 LONG TERM (CURRENT) USE OF ANTITHROMBOTICS/ANTIPLATELETS: ICD-10-CM

## 2018-07-16 DIAGNOSIS — G45.9 TRANSIENT CEREBRAL ISCHEMIC ATTACK, UNSPECIFIED: ICD-10-CM

## 2018-07-16 DIAGNOSIS — Z86.73 PERSONAL HISTORY OF TRANSIENT ISCHEMIC ATTACK (TIA), AND CEREBRAL INFARCTION WITHOUT RESIDUAL DEFICITS: ICD-10-CM

## 2018-07-16 DIAGNOSIS — R40.2252 COMA SCALE, BEST VERBAL RESPONSE, ORIENTED, AT ARRIVAL TO EMERGENCY DEPARTMENT: ICD-10-CM

## 2018-07-16 DIAGNOSIS — R29.701 NIHSS SCORE 1: ICD-10-CM

## 2018-07-16 DIAGNOSIS — Z79.84 LONG TERM (CURRENT) USE OF ORAL HYPOGLYCEMIC DRUGS: ICD-10-CM

## 2018-07-16 DIAGNOSIS — E78.5 HYPERLIPIDEMIA, UNSPECIFIED: ICD-10-CM

## 2018-07-16 DIAGNOSIS — I10 ESSENTIAL (PRIMARY) HYPERTENSION: ICD-10-CM

## 2018-07-16 DIAGNOSIS — E11.9 TYPE 2 DIABETES MELLITUS WITHOUT COMPLICATIONS: ICD-10-CM

## 2018-07-16 DIAGNOSIS — I63.9 CEREBRAL INFARCTION, UNSPECIFIED: ICD-10-CM

## 2018-07-16 DIAGNOSIS — R47.01 APHASIA: ICD-10-CM

## 2018-07-16 DIAGNOSIS — R40.2142 COMA SCALE, EYES OPEN, SPONTANEOUS, AT ARRIVAL TO EMERGENCY DEPARTMENT: ICD-10-CM

## 2018-08-11 ENCOUNTER — EMERGENCY (EMERGENCY)
Facility: HOSPITAL | Age: 83
LOS: 0 days | Discharge: HOME | End: 2018-08-12
Attending: EMERGENCY MEDICINE | Admitting: EMERGENCY MEDICINE

## 2018-08-11 VITALS
OXYGEN SATURATION: 99 % | RESPIRATION RATE: 18 BRPM | TEMPERATURE: 97 F | SYSTOLIC BLOOD PRESSURE: 192 MMHG | HEART RATE: 63 BPM | DIASTOLIC BLOOD PRESSURE: 79 MMHG

## 2018-08-11 DIAGNOSIS — E11.9 TYPE 2 DIABETES MELLITUS WITHOUT COMPLICATIONS: ICD-10-CM

## 2018-08-11 DIAGNOSIS — R41.82 ALTERED MENTAL STATUS, UNSPECIFIED: ICD-10-CM

## 2018-08-11 DIAGNOSIS — E78.00 PURE HYPERCHOLESTEROLEMIA, UNSPECIFIED: ICD-10-CM

## 2018-08-11 DIAGNOSIS — I10 ESSENTIAL (PRIMARY) HYPERTENSION: ICD-10-CM

## 2018-08-11 DIAGNOSIS — E16.2 HYPOGLYCEMIA, UNSPECIFIED: ICD-10-CM

## 2018-08-11 DIAGNOSIS — R47.81 SLURRED SPEECH: ICD-10-CM

## 2018-08-11 DIAGNOSIS — Z88.0 ALLERGY STATUS TO PENICILLIN: ICD-10-CM

## 2018-08-11 LAB
ALBUMIN SERPL ELPH-MCNC: 4.1 G/DL — SIGNIFICANT CHANGE UP (ref 3.5–5.2)
ALP SERPL-CCNC: 62 U/L — SIGNIFICANT CHANGE UP (ref 30–115)
ALT FLD-CCNC: 49 U/L — HIGH (ref 0–41)
ANION GAP SERPL CALC-SCNC: 18 MMOL/L — HIGH (ref 7–14)
APTT BLD: 34.8 SEC — SIGNIFICANT CHANGE UP (ref 27–39.2)
AST SERPL-CCNC: 26 U/L — SIGNIFICANT CHANGE UP (ref 0–41)
BASOPHILS # BLD AUTO: 0.07 K/UL — SIGNIFICANT CHANGE UP (ref 0–0.2)
BASOPHILS NFR BLD AUTO: 0.9 % — SIGNIFICANT CHANGE UP (ref 0–1)
BILIRUB SERPL-MCNC: 0.3 MG/DL — SIGNIFICANT CHANGE UP (ref 0.2–1.2)
BUN SERPL-MCNC: 14 MG/DL — SIGNIFICANT CHANGE UP (ref 10–20)
CALCIUM SERPL-MCNC: 9.5 MG/DL — SIGNIFICANT CHANGE UP (ref 8.5–10.1)
CHLORIDE SERPL-SCNC: 93 MMOL/L — LOW (ref 98–110)
CO2 SERPL-SCNC: 23 MMOL/L — SIGNIFICANT CHANGE UP (ref 17–32)
CREAT SERPL-MCNC: 0.9 MG/DL — SIGNIFICANT CHANGE UP (ref 0.7–1.5)
EOSINOPHIL # BLD AUTO: 0.53 K/UL — SIGNIFICANT CHANGE UP (ref 0–0.7)
EOSINOPHIL NFR BLD AUTO: 6.8 % — SIGNIFICANT CHANGE UP (ref 0–8)
GLUCOSE SERPL-MCNC: 50 MG/DL — LOW (ref 70–99)
HCT VFR BLD CALC: 33.7 % — LOW (ref 37–47)
HGB BLD-MCNC: 11.4 G/DL — LOW (ref 12–16)
IMM GRANULOCYTES NFR BLD AUTO: 0.3 % — SIGNIFICANT CHANGE UP (ref 0.1–0.3)
INR BLD: 1.37 RATIO — HIGH (ref 0.65–1.3)
LACTATE SERPL-SCNC: 1.5 MMOL/L — SIGNIFICANT CHANGE UP (ref 0.5–2.2)
LYMPHOCYTES # BLD AUTO: 1.34 K/UL — SIGNIFICANT CHANGE UP (ref 1.2–3.4)
LYMPHOCYTES # BLD AUTO: 17.3 % — LOW (ref 20.5–51.1)
MAGNESIUM SERPL-MCNC: 2 MG/DL — SIGNIFICANT CHANGE UP (ref 1.8–2.4)
MCHC RBC-ENTMCNC: 30.6 PG — SIGNIFICANT CHANGE UP (ref 27–31)
MCHC RBC-ENTMCNC: 33.8 G/DL — SIGNIFICANT CHANGE UP (ref 32–37)
MCV RBC AUTO: 90.3 FL — SIGNIFICANT CHANGE UP (ref 81–99)
MONOCYTES # BLD AUTO: 0.68 K/UL — HIGH (ref 0.1–0.6)
MONOCYTES NFR BLD AUTO: 8.8 % — SIGNIFICANT CHANGE UP (ref 1.7–9.3)
NEUTROPHILS # BLD AUTO: 5.12 K/UL — SIGNIFICANT CHANGE UP (ref 1.4–6.5)
NEUTROPHILS NFR BLD AUTO: 65.9 % — SIGNIFICANT CHANGE UP (ref 42.2–75.2)
NRBC # BLD: 0 /100 WBCS — SIGNIFICANT CHANGE UP (ref 0–0)
PLATELET # BLD AUTO: 263 K/UL — SIGNIFICANT CHANGE UP (ref 130–400)
POTASSIUM SERPL-MCNC: 4.6 MMOL/L — SIGNIFICANT CHANGE UP (ref 3.5–5)
POTASSIUM SERPL-SCNC: 4.6 MMOL/L — SIGNIFICANT CHANGE UP (ref 3.5–5)
PROT SERPL-MCNC: 6.5 G/DL — SIGNIFICANT CHANGE UP (ref 6–8)
PROTHROM AB SERPL-ACNC: 14.7 SEC — HIGH (ref 9.95–12.87)
RBC # BLD: 3.73 M/UL — LOW (ref 4.2–5.4)
RBC # FLD: 14.2 % — SIGNIFICANT CHANGE UP (ref 11.5–14.5)
SODIUM SERPL-SCNC: 134 MMOL/L — LOW (ref 135–146)
TROPONIN T SERPL-MCNC: <0.01 NG/ML — SIGNIFICANT CHANGE UP
WBC # BLD: 7.76 K/UL — SIGNIFICANT CHANGE UP (ref 4.8–10.8)
WBC # FLD AUTO: 7.76 K/UL — SIGNIFICANT CHANGE UP (ref 4.8–10.8)

## 2018-08-11 RX ORDER — APIXABAN 2.5 MG/1
2.5 TABLET, FILM COATED ORAL ONCE
Qty: 0 | Refills: 0 | Status: COMPLETED | OUTPATIENT
Start: 2018-08-11 | End: 2018-08-11

## 2018-08-11 NOTE — ED PROVIDER NOTE - ATTENDING CONTRIBUTION TO CARE
88 year old female, mhx of afib, no bb or ccb, + history of DM on metformin and sulfonylurea, is bib friend for evaluation of slurred speech that started approx half an hour prior to arrival, no c/sob, no n/v/d, no loc. Upon arrival, stroke code activated, patient then found to be hypoglycemic, FS 50, given dextrose and all symptoms resolved immediately. Discussed case with michelle elizalde, stroke code cancelled, discussed case with DR. Szymanski of tox, advised fingerstick q 2 hours in obs overnight. Discussed wit omd DR. Correa who agrees with the plan.     CONSTITUTIONAL: Well-developed; well-nourished; in no acute distress. Sitting up and providing appropriate history and physical examination  SKIN: skin exam is warm and dry, no acute rash.  HEAD: Normocephalic; atraumatic.  EYES: PERRL, 3 mm bilateral, no nystagmus, EOM intact; conjunctiva and sclera clear.  ENT: No nasal discharge; airway clear.  NECK: Supple; non tender. + full passive ROM in all directions. No JVD  CARD: S1, S2 normal; no murmurs, gallops, or rubs. Regular rate and rhythm. + Symmetric Strong Pulses  RESP: No wheezes, rales or rhonchi. Good air movement bilaterally  ABD: soft; non-distended; non-tender. No Rebound, No Guarding, No signs of peritonitis, No CVA tenderness. No pulsatile abdominal mass. + Strong and Symmetric Pulses  EXT: Normal ROM. No clubbing, cyanosis or edema. Dp and Pt Pulses intact. Cap refill less than 3 seconds  NEURO: CN 2-12 intact, normal finger to nose, normal romberg, stable gait, no sensory or motor deficits, Alert, oriented, grossly unremarkable. No Focal deficits. GCS 15. NIH 0, + slurred speech that resolved post dextrose  PSYCH: Cooperative, appropriate.

## 2018-08-11 NOTE — ED PROVIDER NOTE - MEDICAL DECISION MAKING DETAILS
I personally evaluated the patient. I reviewed the Resident’s or Physician Assistant’s note (as assigned above), and agree with the findings and plan except as documented in my note. Patient will be place din edou. See my notes.

## 2018-08-11 NOTE — ED ADULT NURSE NOTE - PMH
0030-patient asleep in bed at this time. Respirations regular and even. Continue to monitor q 15 minutes for safety. Atrial fibrillation    Diabetes mellitus    Hyperlipidemia    Hypertension    TIA (transient ischemic attack)

## 2018-08-11 NOTE — ED PROVIDER NOTE - OBJECTIVE STATEMENT
88 y f pmh dm, htn, TIA pw difficulty speaking. At [employer] an hour prior to arrival and started having difficulty finding words and slurred speech. Started to improve on the way to the hospital, but was not completely resolved when she arrived. No ams. Denies facial droop, n/v, fever, chills, cp, sob, abd pain, headache.

## 2018-08-11 NOTE — ED PROVIDER NOTE - NS ED ROS FT
Eyes:  No visual changes, eye pain or discharge.  ENMT:  No hearing changes, pain, discharge or infections. No neck pain or stiffness.  Cardiac:  No chest pain, SOB or edema  Respiratory:  No cough or respiratory distress.   GI:  No nausea, vomiting, diarrhea or abdominal pain.  :  No dysuria, frequency or burning.  MS:  No myalgia, muscle weakness, joint pain or back pain.  Neuro:  Difficulty finding words, slurred speech. No headache or weakness.  No LOC.  Skin:  No skin rash.   Endocrine: No history of thyroid disease. +dm.

## 2018-08-11 NOTE — ED PROVIDER NOTE - PROGRESS NOTE DETAILS
Repeat fingerstick 202. Feeling better, per pt's daughter sx have improved. Case endorsed to YOLY Galdamez for obs, q2 fingersticks.

## 2018-08-11 NOTE — ED CDU PROVIDER INITIAL DAY NOTE - ATTENDING CONTRIBUTION TO CARE
Pt presented to the Ed with slurred speech. Yesterday was shopping and had not eaten lunch when she felt confused. She was able to go home and daughter found her to be confused and some slurred speech. Was taken the ER where glucose was 50. pt treated and symptoms resolved. This am pt is asymptomatic. Awake alert oriented x3. Neuro intact. s1s2 rrr, lungs clear, speech is clear.

## 2018-08-11 NOTE — ED CDU PROVIDER INITIAL DAY NOTE - NEURO NEGATIVE STATEMENT, MLM
no loss of consciousness, no gait abnormality, no headache, no sensory deficits, and no weakness. + slurred speech

## 2018-08-11 NOTE — ED PROVIDER NOTE - PHYSICAL EXAMINATION
CONSTITUTIONAL: Well-developed; well-nourished; in no acute distress.   SKIN: warm, dry  HEAD: Normocephalic; atraumatic.  EYES: PERRL, EOMI, normal sclera and conjunctiva   ENT: No nasal discharge; airway clear.  NECK: Supple; non tender.  CARD: S1, S2 normal; no murmurs, gallops, or rubs. Regular rate and rhythm.   RESP: No wheezes, rales or rhonchi.  ABD: soft ntnd  EXT: Normal ROM.  No clubbing, cyanosis or edema.   LYMPH: No acute cervical adenopathy.  NEURO: Alert, oriented, grossly unremarkable. Mild slurred speech. NO facial droop, moving all extremities well with no drift. NIH 1.   PSYCH: Cooperative, appropriate.

## 2018-08-11 NOTE — ED ADULT TRIAGE NOTE - CHIEF COMPLAINT QUOTE
unable to get words out started 40 minutes ago at [employer] as per patients family member as per patients daughter patients still not speaking her normal unable to get words out started 40 minutes ago at [employer]  as per patients daughter patient still not speaking her normal .code stroke initiated

## 2018-08-11 NOTE — ED ADULT NURSE NOTE - CHIEF COMPLAINT QUOTE
unable to get words out started 40 minutes ago at [employer]  as per patients daughter patient still not speaking her normal .code stroke initiated

## 2018-08-11 NOTE — ED ADULT NURSE NOTE - INTERVENTIONS DEFINITIONS
Stretcher in lowest position, wheels locked, appropriate side rails in place/Reinforce activity limits and safety measures with patient and family/Instruct patient to call for assistance/Physically safe environment: no spills, clutter or unnecessary equipment/Monitor for mental status changes and reorient to person, place, and time/Provide visual clues: red socks/Provide visual cue, wrist band, yellow gown, etc.

## 2018-08-11 NOTE — ED ADULT NURSE NOTE - OBJECTIVE STATEMENT
patient reports to the ED with difficulty speaking and slurred speech that began 1 hour pta. family denies patient having altered mental status. patient denies chest pain, abdominal pain, dizziness, weakness, fever, nausea/vomiting

## 2018-08-12 VITALS — OXYGEN SATURATION: 97 % | HEART RATE: 59 BPM

## 2018-08-12 PROBLEM — E78.5 HYPERLIPIDEMIA, UNSPECIFIED: Chronic | Status: ACTIVE | Noted: 2018-07-03

## 2018-08-12 PROBLEM — I10 ESSENTIAL (PRIMARY) HYPERTENSION: Chronic | Status: ACTIVE | Noted: 2018-07-03

## 2018-08-12 PROBLEM — I48.91 UNSPECIFIED ATRIAL FIBRILLATION: Chronic | Status: ACTIVE | Noted: 2018-07-03

## 2018-08-12 PROBLEM — E11.9 TYPE 2 DIABETES MELLITUS WITHOUT COMPLICATIONS: Chronic | Status: ACTIVE | Noted: 2018-07-03

## 2018-08-12 PROBLEM — G45.9 TRANSIENT CEREBRAL ISCHEMIC ATTACK, UNSPECIFIED: Chronic | Status: ACTIVE | Noted: 2018-07-03

## 2018-08-12 LAB
APPEARANCE UR: CLEAR — SIGNIFICANT CHANGE UP
BILIRUB UR-MCNC: NEGATIVE — SIGNIFICANT CHANGE UP
COLOR SPEC: YELLOW — SIGNIFICANT CHANGE UP
DIFF PNL FLD: NEGATIVE — SIGNIFICANT CHANGE UP
EPI CELLS # UR: ABNORMAL /HPF
GLUCOSE UR QL: NEGATIVE MG/DL — SIGNIFICANT CHANGE UP
KETONES UR-MCNC: NEGATIVE — SIGNIFICANT CHANGE UP
LEUKOCYTE ESTERASE UR-ACNC: ABNORMAL
NITRITE UR-MCNC: NEGATIVE — SIGNIFICANT CHANGE UP
PH UR: 6.5 — SIGNIFICANT CHANGE UP (ref 5–8)
PROT UR-MCNC: NEGATIVE MG/DL — SIGNIFICANT CHANGE UP
SP GR SPEC: 1.01 — SIGNIFICANT CHANGE UP (ref 1.01–1.03)
UROBILINOGEN FLD QL: 0.2 MG/DL — SIGNIFICANT CHANGE UP (ref 0.2–0.2)
WBC UR QL: ABNORMAL /HPF

## 2018-08-12 RX ADMIN — APIXABAN 2.5 MILLIGRAM(S): 2.5 TABLET, FILM COATED ORAL at 00:27

## 2018-08-12 NOTE — ED CDU PROVIDER SUBSEQUENT DAY NOTE - PROGRESS NOTE DETAILS
received sign out from YOLY Adorno.   Spoke with Dr. Ny this morning. wants pt to stop taking Glypizide for now. Will reach out with family for outpatient follow up. VSS, pt NAD eating breakfast. ambulates without difficulty. discussed results with pt and family. agrees with plan for dispo home with close PMD follow up. received sign out from YOLY Adorno.   Spoke with Dr. Ny this morning. wants pt to stop taking Glipizide for now. Will reach out with family for outpatient follow up. received sign out from YOLY Galdamez.   Spoke with Dr. Ny this morning. wants pt to stop taking Glipizide for now. Will reach out with family for outpatient follow up.

## 2018-08-12 NOTE — ED CDU PROVIDER SUBSEQUENT DAY NOTE - MEDICAL DECISION MAKING DETAILS
Pt placed into observation for hypoglycemia,. Treated and all symptoms resolved. Plan this am is to discharge with reduction of diabetic medication. Dr. Ny advised. Pt will follow up with Dr. Ny.

## 2018-08-12 NOTE — ED CDU PROVIDER SUBSEQUENT DAY NOTE - HISTORY
89 y/o F, h/o afib-on eliquis, DMII, BIB EMS to the ED for an episode of slurred speech while at the mall with her daughter. symptoms resolved in the ED after an amp of D50. Pt was admitted to obs for serial fingersticks. Pt attributes her hypoglycemic episode to not eating lunch yesterday. no history of similar episodes in the past. no acute events overnight. Pt is asymptomatic without complaints at this time. last fingerstick was at around 630 and was in the 130's. Denies HA, dizziness, n/v, CP, palpitations, SOB. 87 y/o F, h/o afib-on eliquis, DMII, BIB EMS to the ED for an episode of slurred speech while at the mall with her daughter. symptoms resolved in the ED after an amp of D50. Pt was admitted to obs for serial fingersticks. Pt attributes her hypoglycemic episode to not eating lunch yesterday. no history of similar episodes in the past. no acute events overnight. Pt is asymptomatic without complaints at this time. last fingerstick was at around 630 and was 145. Denies HA, dizziness, n/v, CP, palpitations, SOB.

## 2018-08-12 NOTE — ED CDU PROVIDER SUBSEQUENT DAY NOTE - PHYSICAL EXAMINATION
VITAL SIGNS: I have reviewed nursing notes and confirm.  CONSTITUTIONAL: Well-developed; well-nourished; in no acute distress.  SKIN: Skin exam is warm and dry, <2 sec cap refill  HEAD: Normocephalic; atraumatic.  EYES: PERRL, EOM intact; normal conjunctiva.  ENT: MMM; airway clear.   NECK: Supple; non tender.  CARD: RRR, 2+ dp pulses  RESP: No wheezes, rales or rhonchi, speaking in full sentences  ABD: soft non tender.   EXT: Normal ROM. No edema.  NEURO: Alert, oriented. Grossly unremarkable. No focal deficits.  PSYCH: Cooperative, appropriate.

## 2018-08-12 NOTE — ED CDU PROVIDER DISPOSITION NOTE - CLINICAL COURSE
Pt presented with confusion and slurred speech. Hx of diabetes on oral hypoglycemics. Found to be hypoglycemic. After treatment symptoms resolved. Pt placed into observation. While in observation glucose was monitored and remained stable. This morning YOLY Arenas spoke to Dr. Ny to give update. Advised to hold glyburide and discharge.

## 2018-08-12 NOTE — ED ADULT NURSE REASSESSMENT NOTE - NS ED NURSE REASSESS COMMENT FT1
Patient observed overnight. Patient FS recorded and no s/s of hypoglycemia. L AC IV intact, patient slept throughout the night, and safety maintained. Awaiting further dispo.

## 2018-08-13 LAB
CULTURE RESULTS: SIGNIFICANT CHANGE UP
SPECIMEN SOURCE: SIGNIFICANT CHANGE UP

## 2018-08-24 ENCOUNTER — EMERGENCY (EMERGENCY)
Facility: HOSPITAL | Age: 83
LOS: 0 days | Discharge: HOME | End: 2018-08-24
Attending: EMERGENCY MEDICINE | Admitting: EMERGENCY MEDICINE

## 2018-08-24 VITALS
RESPIRATION RATE: 18 BRPM | OXYGEN SATURATION: 97 % | HEIGHT: 63 IN | TEMPERATURE: 96 F | HEART RATE: 66 BPM | DIASTOLIC BLOOD PRESSURE: 74 MMHG | SYSTOLIC BLOOD PRESSURE: 198 MMHG | WEIGHT: 134.04 LBS

## 2018-08-24 DIAGNOSIS — I10 ESSENTIAL (PRIMARY) HYPERTENSION: ICD-10-CM
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
## 2018-08-24 DIAGNOSIS — Z79.02 LONG TERM (CURRENT) USE OF ANTITHROMBOTICS/ANTIPLATELETS: ICD-10-CM
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
## 2018-08-24 DIAGNOSIS — E11.9 TYPE 2 DIABETES MELLITUS WITHOUT COMPLICATIONS: ICD-10-CM
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
## 2018-08-24 DIAGNOSIS — M54.6 PAIN IN THORACIC SPINE: ICD-10-CM

## 2018-08-24 DIAGNOSIS — M54.9 DORSALGIA, UNSPECIFIED: ICD-10-CM

## 2018-08-24 DIAGNOSIS — Z88.0 ALLERGY STATUS TO PENICILLIN: ICD-10-CM

## 2018-08-24 DIAGNOSIS — Z79.82 LONG TERM (CURRENT) USE OF ASPIRIN: ICD-10-CM
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
## 2018-08-24 DIAGNOSIS — R07.81 PLEURODYNIA: ICD-10-CM

## 2018-08-24 DIAGNOSIS — Z86.73 PERSONAL HISTORY OF TRANSIENT ISCHEMIC ATTACK (TIA), AND CEREBRAL INFARCTION WITHOUT RESIDUAL DEFICITS: ICD-10-CM

## 2018-08-24 NOTE — ED PROVIDER NOTE - PROGRESS NOTE DETAILS
Discussed results with pt.  All questions were answered and return precautions discussed.  Pt is comfortable at this time.  Unremarkable re-exam.  No further concerns at this time from pt.  Will follow up with PMD and rehab clinic.  Pt understands and agrees with tx plan.

## 2018-08-24 NOTE — ED ADULT NURSE NOTE - NSFALLRSKASSESSTYPE_ED_ALL_ED
right heel without radiographic evidence of osteomyelitis  left heel with ? of osteomyelitis  would favor bone bx in to determine acute v chronic  antibiotics already started  cont local care. Initial (On Arrival)

## 2018-08-24 NOTE — ED PROVIDER NOTE - OBJECTIVE STATEMENT
88 y.o female with hx of a-fib, TIA, DM, HTN, HLD presents to the ED for evaluation of back pain.  On Sunday pt had mechanical trip and fell backwards and hit her upper back on a wooden object.  Fall was witnessed with no head injury, LOC, neck pain.  Since then pt has been experiencing lateral right rib pain.  Pain worse with movement and alleviated with rest.  Has not tried medications for this pain.  Pain has lasted for > 5 days prompting visit to the ED.  Pt adds no complaints at this time.  Denies dyspnea, chest pain, extremities, headache, nausea, vomiting, ecchymosis of thorax.  Per family pt acting at baseline.

## 2018-08-24 NOTE — ED PROVIDER NOTE - PHYSICAL EXAMINATION
CONST: Well appearing in NAD  HEAD: NC/AT  EYES: PERRL, EOMI, Sclera and conjunctiva clear.  ENT: No nasal discharge.  Oropharynx normal appearing, no erythema or exudates. Uvula midline.  NECK: Non-tender, no meningeal signs  CARD: Normal S1 S2; Normal rate and rhythm  RESP: Equal BS B/L, No wheezes, rhonchi or rales. No distress  GI: Soft, non-tender, non-distended.  MS: no midline C/T/L tenderness, + TTP of right lateral ribs, mild TTP of ribs, no stepoff or deformity, Mild lateral rib pain with lateral compression, Normal ROM in all extremities. no TTP of extremities, radial pulses 2+ bilaterally  SKIN: Warm, dry, no acute rashes. Good turgor  NEURO: A&Ox3, CN II-XII intact, no focal deficits, no facial asymmetry, no pronator drift, normal finger to nose, no nystagmus, gross sensation intact, UE/LE strength 5/5, stable gait

## 2018-12-06 ENCOUNTER — OUTPATIENT (OUTPATIENT)
Dept: OUTPATIENT SERVICES | Facility: HOSPITAL | Age: 83
LOS: 1 days | Discharge: HOME | End: 2018-12-06

## 2018-12-06 DIAGNOSIS — I48.92 UNSPECIFIED ATRIAL FLUTTER: ICD-10-CM

## 2018-12-06 DIAGNOSIS — E11.9 TYPE 2 DIABETES MELLITUS WITHOUT COMPLICATIONS: ICD-10-CM

## 2018-12-06 DIAGNOSIS — E78.2 MIXED HYPERLIPIDEMIA: ICD-10-CM

## 2018-12-06 DIAGNOSIS — G45.9 TRANSIENT CEREBRAL ISCHEMIC ATTACK, UNSPECIFIED: ICD-10-CM

## 2018-12-06 DIAGNOSIS — I06.1 RHEUMATIC AORTIC INSUFFICIENCY: ICD-10-CM

## 2018-12-06 DIAGNOSIS — I48.0 PAROXYSMAL ATRIAL FIBRILLATION: ICD-10-CM

## 2018-12-06 DIAGNOSIS — I31.3 PERICARDIAL EFFUSION (NONINFLAMMATORY): ICD-10-CM

## 2018-12-06 DIAGNOSIS — R01.1 CARDIAC MURMUR, UNSPECIFIED: ICD-10-CM

## 2018-12-06 DIAGNOSIS — I05.0 RHEUMATIC MITRAL STENOSIS: ICD-10-CM

## 2018-12-06 DIAGNOSIS — I49.3 VENTRICULAR PREMATURE DEPOLARIZATION: ICD-10-CM

## 2018-12-06 DIAGNOSIS — I65.23 OCCLUSION AND STENOSIS OF BILATERAL CAROTID ARTERIES: ICD-10-CM

## 2019-01-19 ENCOUNTER — EMERGENCY (EMERGENCY)
Facility: HOSPITAL | Age: 84
LOS: 0 days | Discharge: HOME | End: 2019-01-19
Attending: STUDENT IN AN ORGANIZED HEALTH CARE EDUCATION/TRAINING PROGRAM | Admitting: STUDENT IN AN ORGANIZED HEALTH CARE EDUCATION/TRAINING PROGRAM
Payer: MEDICARE

## 2019-01-19 VITALS
DIASTOLIC BLOOD PRESSURE: 75 MMHG | OXYGEN SATURATION: 98 % | TEMPERATURE: 97 F | SYSTOLIC BLOOD PRESSURE: 125 MMHG | HEART RATE: 74 BPM | RESPIRATION RATE: 20 BRPM

## 2019-01-19 VITALS
TEMPERATURE: 98 F | OXYGEN SATURATION: 99 % | DIASTOLIC BLOOD PRESSURE: 72 MMHG | SYSTOLIC BLOOD PRESSURE: 156 MMHG | HEART RATE: 77 BPM | RESPIRATION RATE: 18 BRPM
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
## 2019-01-19 DIAGNOSIS — M25.551 PAIN IN RIGHT HIP: ICD-10-CM
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
## 2019-01-19 DIAGNOSIS — R51 HEADACHE: ICD-10-CM

## 2019-01-19 DIAGNOSIS — E11.9 TYPE 2 DIABETES MELLITUS WITHOUT COMPLICATIONS: ICD-10-CM

## 2019-01-19 DIAGNOSIS — E78.5 HYPERLIPIDEMIA, UNSPECIFIED: ICD-10-CM
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
## 2019-01-19 DIAGNOSIS — I10 ESSENTIAL (PRIMARY) HYPERTENSION: ICD-10-CM

## 2019-01-19 LAB
ALBUMIN SERPL ELPH-MCNC: 4.2 G/DL — SIGNIFICANT CHANGE UP (ref 3.5–5.2)
ALP SERPL-CCNC: 51 U/L — SIGNIFICANT CHANGE UP (ref 30–115)
ALT FLD-CCNC: 13 U/L — SIGNIFICANT CHANGE UP (ref 0–41)
ANION GAP SERPL CALC-SCNC: 20 MMOL/L — HIGH (ref 7–14)
APTT BLD: 36.2 SEC — SIGNIFICANT CHANGE UP (ref 27–39.2)
AST SERPL-CCNC: 17 U/L — SIGNIFICANT CHANGE UP (ref 0–41)
BASOPHILS # BLD AUTO: 0.05 K/UL — SIGNIFICANT CHANGE UP (ref 0–0.2)
BASOPHILS NFR BLD AUTO: 0.6 % — SIGNIFICANT CHANGE UP (ref 0–1)
BILIRUB SERPL-MCNC: 0.5 MG/DL — SIGNIFICANT CHANGE UP (ref 0.2–1.2)
BUN SERPL-MCNC: 16 MG/DL — SIGNIFICANT CHANGE UP (ref 10–20)
CALCIUM SERPL-MCNC: 9.6 MG/DL — SIGNIFICANT CHANGE UP (ref 8.5–10.1)
CHLORIDE SERPL-SCNC: 95 MMOL/L — LOW (ref 98–110)
CO2 SERPL-SCNC: 19 MMOL/L — SIGNIFICANT CHANGE UP (ref 17–32)
CREAT SERPL-MCNC: 0.8 MG/DL — SIGNIFICANT CHANGE UP (ref 0.7–1.5)
EOSINOPHIL # BLD AUTO: 0.12 K/UL — SIGNIFICANT CHANGE UP (ref 0–0.7)
EOSINOPHIL NFR BLD AUTO: 1.3 % — SIGNIFICANT CHANGE UP (ref 0–8)
ETHANOL SERPL-MCNC: <10 MG/DL — HIGH
GLUCOSE SERPL-MCNC: 90 MG/DL — SIGNIFICANT CHANGE UP (ref 70–99)
HCT VFR BLD CALC: 33.7 % — LOW (ref 37–47)
HGB BLD-MCNC: 11.6 G/DL — LOW (ref 12–16)
IMM GRANULOCYTES NFR BLD AUTO: 0.3 % — SIGNIFICANT CHANGE UP (ref 0.1–0.3)
INR BLD: 1.48 RATIO — HIGH (ref 0.65–1.3)
LACTATE SERPL-SCNC: 2.8 MMOL/L — HIGH (ref 0.5–2.2)
LIDOCAIN IGE QN: 82 U/L — HIGH (ref 7–60)
LYMPHOCYTES # BLD AUTO: 1.44 K/UL — SIGNIFICANT CHANGE UP (ref 1.2–3.4)
LYMPHOCYTES # BLD AUTO: 16.2 % — LOW (ref 20.5–51.1)
MCHC RBC-ENTMCNC: 30.9 PG — SIGNIFICANT CHANGE UP (ref 27–31)
MCHC RBC-ENTMCNC: 34.4 G/DL — SIGNIFICANT CHANGE UP (ref 32–37)
MCV RBC AUTO: 89.6 FL — SIGNIFICANT CHANGE UP (ref 81–99)
MONOCYTES # BLD AUTO: 0.87 K/UL — HIGH (ref 0.1–0.6)
MONOCYTES NFR BLD AUTO: 9.8 % — HIGH (ref 1.7–9.3)
NEUTROPHILS # BLD AUTO: 6.4 K/UL — SIGNIFICANT CHANGE UP (ref 1.4–6.5)
NEUTROPHILS NFR BLD AUTO: 71.8 % — SIGNIFICANT CHANGE UP (ref 42.2–75.2)
PLATELET # BLD AUTO: 277 K/UL — SIGNIFICANT CHANGE UP (ref 130–400)
POTASSIUM SERPL-MCNC: 4.7 MMOL/L — SIGNIFICANT CHANGE UP (ref 3.5–5)
POTASSIUM SERPL-SCNC: 4.7 MMOL/L — SIGNIFICANT CHANGE UP (ref 3.5–5)
PROT SERPL-MCNC: 6.4 G/DL — SIGNIFICANT CHANGE UP (ref 6–8)
PROTHROM AB SERPL-ACNC: 16.9 SEC — HIGH (ref 9.95–12.87)
RBC # BLD: 3.76 M/UL — LOW (ref 4.2–5.4)
RBC # FLD: 14 % — SIGNIFICANT CHANGE UP (ref 11.5–14.5)
SODIUM SERPL-SCNC: 134 MMOL/L — LOW (ref 135–146)
TYPE + AB SCN PNL BLD: SIGNIFICANT CHANGE UP
WBC # BLD: 8.91 K/UL — SIGNIFICANT CHANGE UP (ref 4.8–10.8)
WBC # FLD AUTO: 8.91 K/UL — SIGNIFICANT CHANGE UP (ref 4.8–10.8)

## 2019-01-19 PROCEDURE — 99283 EMERGENCY DEPT VISIT LOW MDM: CPT

## 2019-01-19 NOTE — ED PROVIDER NOTE - NS ED ROS FT
Constitutional: See HPI.  Eyes: No visual changes, eye pain or discharge. No Photophobia  ENMT: No neck pain or stiffness. No limited ROM  Cardiac: No SOB or edema. No chest pain with exertion.  Respiratory: No cough or respiratory distress. No hemoptysis.   GI: No nausea, vomiting, diarrhea or abdominal pain.  : No dysuria, frequency or burning. No Discharge  MS: No myalgia, muscle weakness, joint pain.  Neuro: No headache or weakness. No LOC.  Skin: No skin rash.  Except as documented in the HPI, all other systems are negative.

## 2019-01-19 NOTE — ED ADULT NURSE NOTE - OBJECTIVE STATEMENT
patient fell out of bed 2 days ago, states she hit back of head. unsure of loc but doesn't think so. c/o pain to lower back, lower right side of back, back of head. denies dizziness.

## 2019-01-19 NOTE — ED PROVIDER NOTE - OBJECTIVE STATEMENT
89 a. maya [place], HTN, DM, HLD, presents 2 days after a fall. States she fell out of bed, hit the back of her head. No LOC. Didn't tell daughter until later, brought in now because she was generally feeling unwell. Some back pain. Denies dizziness, nausea, vomiting, blurred vision, neck pain.

## 2019-01-19 NOTE — ED PROVIDER NOTE - MEDICAL DECISION MAKING DETAILS
pt here for fall from bed days ago. pt c/o R hip pain and mild ha. CTs w/o acute findings. no evidence of hip/femur fx. stable for dc

## 2019-01-19 NOTE — CONSULT NOTE ADULT - SUBJECTIVE AND OBJECTIVE BOX
TRAUMA ACTIVATION LEVEL:  Alert    MECHANISM OF INJURY: [] Fall	    GCS: 	E: 4	V: 5	M: 6      HPI: 89y/o F, w/ PMH of Afib (on Eliquis), DM (on Metformin), and other PMH listed below, presented to ED s/p fall. Pt accompanied by her daughter, who states the pt fell from standing 2 days ago in her bedroom, hitting her head on the wall, as she was told by the pt's aide. Brought to the ED today complaining of back pain. Denies LOC and other complaints. Denies HA, CP, SOB, abd pain, pain of extremities, n/v, fever/chills.      PAST MEDICAL & SURGICAL HISTORY:  Atrial fibrillation  TIA (transient ischemic attack)  Diabetes mellitus  Hyperlipidemia  Hypertension      Allergies    penicillin (Anaphylaxis; Hives)    Intolerances        Home Medications:  amiodarone 200 mg oral tablet: 1 tab(s) orally once a day (19 Jan 2019 13:00)  aspirin 81 mg oral tablet: 1 tab(s) orally once a day (19 Jan 2019 13:00)  enalapril 2.5 mg oral tablet: 1 tab(s) orally once a day (19 Jan 2019 13:00)  ferrous sulfate 325 mg (65 mg elemental iron) oral tablet:  (19 Jan 2019 13:00)  glipiZIDE 5 mg oral tablet: 1 tab(s) orally every 12 hours (19 Jan 2019 13:00)  Januvia 50 mg oral tablet: 1 tab(s) orally once a day (19 Jan 2019 13:00)  metFORMIN 500 mg oral tablet: 1 tab(s) orally 2 times a day (19 Jan 2019 13:00)  simvastatin 20 mg oral tablet: 1 tab(s) orally once a day (at bedtime) (19 Jan 2019 13:00)  Vitamin B12 1000 mcg/mL injectable solution:  (19 Jan 2019 13:00)      ROS: 10-system review is otherwise negative except HPI above.      Primary Survey:    A - airway intact  B - bilateral breath sounds and good chest rise  C - palpable pulses in all extremities  D - GCS 15 on arrival, SELBY  Exposure obtained    Vital Signs Last 24 Hrs  T(C): 36.7 (19 Jan 2019 12:53), Max: 36.7 (19 Jan 2019 12:53)  T(F): 98.1 (19 Jan 2019 12:53), Max: 98.1 (19 Jan 2019 12:53)  HR: 68 (19 Jan 2019 12:53) (68 - 74)  BP: 131/43 (19 Jan 2019 12:53) (125/75 - 131/43)  BP(mean): --  RR: 20 (19 Jan 2019 12:53) (20 - 20)  SpO2: 99% (19 Jan 2019 12:53) (98% - 99%)    Secondary Survey:   General: NAD  HEENT: Normocephalic, atraumatic, EOMI, PEERLA. no scalp lacerations   Neck: Soft, midline trachea. no cspine tenderness  Chest: No chest wall tenderness. or subq  emphysema   Cardiac: S1, S2, RRR  Respiratory: Bilateral breath sounds, clear and equal bilaterally  Abdomen: Soft, non-distended, non-tender, no rebound,   Groin: Normal appearing, pelvis stable   Ext: palpable distal pulses      Procedures:    LABS:  Labs:  CAPILLARY BLOOD GLUCOSE      POCT Blood Glucose.: 97 mg/dL (19 Jan 2019 12:53)                          11.6   8.91  )-----------( 277      ( 19 Jan 2019 12:49 )             33.7       Auto Neutrophil %: 71.8 % (01-19-19 @ 12:49)  Auto Immature Granulocyte %: 0.3 % (01-19-19 @ 12:49)            LFTs:         Coags:                        RADIOLOGY & ADDITIONAL STUDIES:    pending    --------------------------------------------------------------------------------------- TRAUMA ACTIVATION LEVEL:  Alert    MECHANISM OF INJURY: [] Fall	    GCS: 	E: 4	V: 5	M: 6      HPI: 89y/o F, w/ PMH of Afib (on Eliquis), DM (on Metformin), and other PMH listed below, presented to ED s/p fall. Pt accompanied by her daughter, who states the pt fell from standing 2 days ago in her bedroom, hitting her head on the wall, as she was told by the pt's aide. Brought to the ED today complaining of back pain. Denies LOC and other complaints. Denies HA, CP, SOB, abd pain, pain of extremities, n/v, fever/chills.      PAST MEDICAL & SURGICAL HISTORY:  Atrial fibrillation  TIA (transient ischemic attack)  Diabetes mellitus  Hyperlipidemia  Hypertension      Allergies    penicillin (Anaphylaxis; Hives)    Intolerances        Home Medications:  amiodarone 200 mg oral tablet: 1 tab(s) orally once a day (19 Jan 2019 13:00)  aspirin 81 mg oral tablet: 1 tab(s) orally once a day (19 Jan 2019 13:00)  enalapril 2.5 mg oral tablet: 1 tab(s) orally once a day (19 Jan 2019 13:00)  ferrous sulfate 325 mg (65 mg elemental iron) oral tablet:  (19 Jan 2019 13:00)  glipiZIDE 5 mg oral tablet: 1 tab(s) orally every 12 hours (19 Jan 2019 13:00)  Januvia 50 mg oral tablet: 1 tab(s) orally once a day (19 Jan 2019 13:00)  metFORMIN 500 mg oral tablet: 1 tab(s) orally 2 times a day (19 Jan 2019 13:00)  simvastatin 20 mg oral tablet: 1 tab(s) orally once a day (at bedtime) (19 Jan 2019 13:00)  Vitamin B12 1000 mcg/mL injectable solution:  (19 Jan 2019 13:00)      ROS: 10-system review is otherwise negative except HPI above.      Primary Survey:    A - airway intact  B - bilateral breath sounds and good chest rise  C - palpable pulses in all extremities  D - GCS 15 on arrival, SELBY  Exposure obtained    Vital Signs Last 24 Hrs  T(C): 36.7 (19 Jan 2019 12:53), Max: 36.7 (19 Jan 2019 12:53)  T(F): 98.1 (19 Jan 2019 12:53), Max: 98.1 (19 Jan 2019 12:53)  HR: 68 (19 Jan 2019 12:53) (68 - 74)  BP: 131/43 (19 Jan 2019 12:53) (125/75 - 131/43)  BP(mean): --  RR: 20 (19 Jan 2019 12:53) (20 - 20)  SpO2: 99% (19 Jan 2019 12:53) (98% - 99%)    Secondary Survey:   General: NAD  HEENT: Normocephalic, atraumatic, EOMI, PEERLA. no scalp lacerations   Neck: Soft, midline trachea. no cspine tenderness  Chest: No chest wall tenderness. or subq  emphysema   Cardiac: S1, S2, RRR  Respiratory: Bilateral breath sounds, clear and equal bilaterally  Abdomen: Soft, non-distended, non-tender, no rebound,   Groin: Normal appearing, pelvis stable   Ext: palpable distal pulses      Procedures:    LABS:    Labs:  CAPILLARY BLOOD GLUCOSE      POCT Blood Glucose.: 97 mg/dL (19 Jan 2019 12:53)                          11.6   8.91  )-----------( 277      ( 19 Jan 2019 12:49 )             33.7       Auto Neutrophil %: 71.8 % (01-19-19 @ 12:49)  Auto Immature Granulocyte %: 0.3 % (01-19-19 @ 12:49)    01-19    134<L>  |  95<L>  |  16  ----------------------------<  90  4.7   |  19  |  0.8      eGFR if Non African American: 65 mL/min/1.73M2 (01-19-19 @ 12:49)      LFTs:             6.4  | 0.5  | 17       ------------------[51      ( 19 Jan 2019 12:49 )  4.2  | x    | 13          Lipase:82     Amylase:x         Lactate, Blood: 2.8 mmol/L (01-19-19 @ 12:49)      Coags:     16.90  ----< 1.48    ( 19 Jan 2019 12:49 )     36.2              Alcohol, Blood: <10 mg/dL (01-19-19 @ 12:49)          RADIOLOGY & ADDITIONAL STUDIES:      < from: CT Head No Cont (01.19.19 @ 14:05) >    IMPRESSION:     1.  No evidence of acute intracranial pathology.      2.  Chronic microvascular ischemic changes.    < end of copied text >  < from: CT Cervical Spine No Cont (01.19.19 @ 14:05) >  IMPRESSION:    No evidence of a cervical spine fracture or subluxation.    < end of copied text >  < from: CT Chest w/ IV Cont (01.19.19 @ 14:06) >  IMPRESSION:        No CT evidence of acute intrathoracic or abdominopelvic traumatic injury.    3 mm right upper lobe groundglass nodule (series 5/99). No routine   follow-up is recommended.  I have reviewed the above preliminary report with the following   comments-there is a small pericardial effusion etiology not apparent on   this exam.                ---------------------------------------------------------------------------------------

## 2019-01-19 NOTE — ED PROVIDER NOTE - PROGRESS NOTE DETAILS
d/w rad resident Patsy, no evidence of fx on CT pelvis Discussed results with patient. Able to go home, feels well. Ambulated without difficulty. Can f/u with PMD in the next 2 days.

## 2019-01-19 NOTE — ED ADULT NURSE NOTE - NSIMPLEMENTINTERV_GEN_ALL_ED
Implemented All Fall with Harm Risk Interventions:  [place] to call system. Call bell, personal items and telephone within reach. Instruct patient to call for assistance. Room bathroom lighting operational. Non-slip footwear when patient is off stretcher. Physically safe environment: no spills, clutter or unnecessary equipment. Stretcher in lowest position, wheels locked, appropriate side rails in place. Provide visual cue, wrist band, yellow gown, etc. Monitor gait and stability. Monitor for mental status changes and reorient to person, place, and time. Review medications for side effects contributing to fall risk. Reinforce activity limits and safety measures with patient and family. Provide visual clues: red socks.

## 2019-01-19 NOTE — CONSULT NOTE ADULT - ASSESSMENT
ASSESSMENT:  89y/o F, w/ PMH of Afib on Eliquis, s/p fall from standing and hit her head on a wall. (As per daughter and aide.)     PLAN:    - f/u panscan  - f/u labs ASSESSMENT:  88y/o F, w/ PMH of Afib on Eliquis, s/p fall from standing and hit her head on a wall. (As per daughter and aide.)     PLAN:    - f/u panscan  - f/u labs    Senior Trauma Resident Note  89yo s/p fall from standing on thurs -ht -loc on eliquis for afib  pan scan neg - pericardial effusion is nonsignificant   cleared from trauma  Airway intact  Bilateral Breath Sounds  Palpable pulses in 4 ext  GCS 15, PERRL, SELBY  VSS  No Subq emphysema, abdominal tenderness,  or pelvic instability   CXR and PXR negative  FAST neg  Ct findings above  Will Dispo accordingly  Plan as above d/w Dr Rossi Oseguera

## 2019-01-19 NOTE — ED PROVIDER NOTE - PHYSICAL EXAMINATION
AOx4, well appearing 88 yo F, NAD, speaking in full sentences. GCS 15.  Skin - warm and dry, no acute rash.   Head - normocephalic, atraumatic.   Eyes - PERRLA/EOMI, conjunctiva and sclera clear.   ENT- MM moist, no nasal discharge.  Pharynx unremarkable.  No mastoid or temporal ttp.   Neck - supple nt, no meningeal signs.   Heart - RRR s1s2 nl, no rub/murmur.   Lungs- No retractions, BS equal, CTAB.   Abdomen - soft ntnd no r/g.   Extremities- moves all, +equal distal pulses, brisk cap refill, sensation wnl, normal ROM. No LE edema, calves nttp b/l.

## 2019-01-19 NOTE — ED PROVIDER NOTE - ATTENDING CONTRIBUTION TO CARE
89 a. fib Eliquis, HTN, DM, HLD, presents 2 days after a fall. pt states she fell getting out of bed. no cp/sob/palpitations. pt endorsing mild occipital ha, no lac/bleed. no neck pain. no back pain. + R Hip/thigh pain. pt ambulatory despite pain    Trauma alert called for head injury on AC  VS  A: intact, protected  B: breath sounds equal b/l, no cyanosis  C: extremities warm and well perfused      H: NCAT,   T: oropharynx clear  Card: RRR, nml s1s2  Pulm: CTAB, no wheezing, breath sounds equal  Abd: S, NT, ND  Spine: no C/T/L spine TTP  Pelvis: stable  Extremities: no gross deformities, mild R hip/thigh ttp  Neuro: Awake, alert, orientedx3, no gross focal neuro neuro deficits, moving all extremities    Plan:  Trauma labs  CXR, Pelvis XR  CTH, CTCS, panscan given age, mechanism on ac    Dispo pending w/u

## 2019-01-19 NOTE — ED ADULT TRIAGE NOTE - CHIEF COMPLAINT QUOTE
fall out of bed today, unsure how it happened, unwitnessed. Pt states she hit her head and c/o lower back pain. Pt on eliquis.

## 2019-03-19 ENCOUNTER — OUTPATIENT (OUTPATIENT)
Dept: OUTPATIENT SERVICES | Facility: HOSPITAL | Age: 84
LOS: 1 days | Discharge: HOME | End: 2019-03-19

## 2019-03-19 DIAGNOSIS — I06.0 RHEUMATIC AORTIC STENOSIS: ICD-10-CM

## 2019-03-19 DIAGNOSIS — I10 ESSENTIAL (PRIMARY) HYPERTENSION: ICD-10-CM

## 2019-03-19 DIAGNOSIS — I48.92 UNSPECIFIED ATRIAL FLUTTER: ICD-10-CM

## 2019-03-19 DIAGNOSIS — I34.2 NONRHEUMATIC MITRAL (VALVE) STENOSIS: ICD-10-CM

## 2019-03-19 DIAGNOSIS — I05.0 RHEUMATIC MITRAL STENOSIS: ICD-10-CM

## 2019-03-19 DIAGNOSIS — I06.1 RHEUMATIC AORTIC INSUFFICIENCY: ICD-10-CM

## 2019-03-19 DIAGNOSIS — I35.0 NONRHEUMATIC AORTIC (VALVE) STENOSIS: ICD-10-CM

## 2019-03-19 DIAGNOSIS — G45.9 TRANSIENT CEREBRAL ISCHEMIC ATTACK, UNSPECIFIED: ICD-10-CM

## 2019-03-19 DIAGNOSIS — I48.0 PAROXYSMAL ATRIAL FIBRILLATION: ICD-10-CM

## 2019-03-19 DIAGNOSIS — R01.1 CARDIAC MURMUR, UNSPECIFIED: ICD-10-CM

## 2019-03-19 DIAGNOSIS — I35.1 NONRHEUMATIC AORTIC (VALVE) INSUFFICIENCY: ICD-10-CM

## 2019-03-19 DIAGNOSIS — I65.23 OCCLUSION AND STENOSIS OF BILATERAL CAROTID ARTERIES: ICD-10-CM

## 2019-03-19 DIAGNOSIS — I34.0 NONRHEUMATIC MITRAL (VALVE) INSUFFICIENCY: ICD-10-CM

## 2019-03-19 DIAGNOSIS — I48.91 UNSPECIFIED ATRIAL FIBRILLATION: ICD-10-CM

## 2019-03-19 DIAGNOSIS — E78.2 MIXED HYPERLIPIDEMIA: ICD-10-CM

## 2019-03-19 DIAGNOSIS — E11.9 TYPE 2 DIABETES MELLITUS WITHOUT COMPLICATIONS: ICD-10-CM

## 2019-03-19 DIAGNOSIS — I31.3 PERICARDIAL EFFUSION (NONINFLAMMATORY): ICD-10-CM

## 2019-06-09 ENCOUNTER — INPATIENT (INPATIENT)
Facility: HOSPITAL | Age: 84
LOS: 3 days | Discharge: NURSING, REHAB OR LONG-TERM CARE | End: 2019-06-13
Attending: INTERNAL MEDICINE | Admitting: INTERNAL MEDICINE
Payer: MEDICARE

## 2019-06-09 VITALS
DIASTOLIC BLOOD PRESSURE: 81 MMHG | RESPIRATION RATE: 18 BRPM | SYSTOLIC BLOOD PRESSURE: 167 MMHG | HEIGHT: 63 IN | HEART RATE: 84 BPM | TEMPERATURE: 97 F | OXYGEN SATURATION: 100 % | WEIGHT: 119.93 LBS

## 2019-06-09 LAB
ALBUMIN SERPL ELPH-MCNC: 4.3 G/DL — SIGNIFICANT CHANGE UP (ref 3.5–5.2)
ALP SERPL-CCNC: 68 U/L — SIGNIFICANT CHANGE UP (ref 30–115)
ALT FLD-CCNC: 16 U/L — SIGNIFICANT CHANGE UP (ref 0–41)
ANION GAP SERPL CALC-SCNC: 21 MMOL/L — HIGH (ref 7–14)
APPEARANCE UR: CLEAR — SIGNIFICANT CHANGE UP
APTT BLD: 34.6 SEC — SIGNIFICANT CHANGE UP (ref 27–39.2)
AST SERPL-CCNC: 16 U/L — SIGNIFICANT CHANGE UP (ref 0–41)
BASE EXCESS BLDV CALC-SCNC: 2.4 MMOL/L — HIGH (ref -2–2)
BASOPHILS # BLD AUTO: 0.04 K/UL — SIGNIFICANT CHANGE UP (ref 0–0.2)
BASOPHILS NFR BLD AUTO: 0.5 % — SIGNIFICANT CHANGE UP (ref 0–1)
BILIRUB SERPL-MCNC: 0.3 MG/DL — SIGNIFICANT CHANGE UP (ref 0.2–1.2)
BILIRUB UR-MCNC: NEGATIVE — SIGNIFICANT CHANGE UP
BLD GP AB SCN SERPL QL: SIGNIFICANT CHANGE UP
BUN SERPL-MCNC: 16 MG/DL — SIGNIFICANT CHANGE UP (ref 10–20)
CA-I SERPL-SCNC: 1.2 MMOL/L — SIGNIFICANT CHANGE UP (ref 1.12–1.3)
CALCIUM SERPL-MCNC: 10.3 MG/DL — HIGH (ref 8.5–10.1)
CHLORIDE SERPL-SCNC: 96 MMOL/L — LOW (ref 98–110)
CO2 SERPL-SCNC: 19 MMOL/L — SIGNIFICANT CHANGE UP (ref 17–32)
COLOR SPEC: YELLOW — SIGNIFICANT CHANGE UP
CREAT SERPL-MCNC: 0.9 MG/DL — SIGNIFICANT CHANGE UP (ref 0.7–1.5)
DIFF PNL FLD: NEGATIVE — SIGNIFICANT CHANGE UP
EOSINOPHIL # BLD AUTO: 0.17 K/UL — SIGNIFICANT CHANGE UP (ref 0–0.7)
EOSINOPHIL NFR BLD AUTO: 2.3 % — SIGNIFICANT CHANGE UP (ref 0–8)
ETHANOL SERPL-MCNC: <10 MG/DL — SIGNIFICANT CHANGE UP
GAS PNL BLDV: 139 MMOL/L — SIGNIFICANT CHANGE UP (ref 136–145)
GAS PNL BLDV: SIGNIFICANT CHANGE UP
GLUCOSE SERPL-MCNC: 107 MG/DL — HIGH (ref 70–99)
GLUCOSE UR QL: NEGATIVE MG/DL — SIGNIFICANT CHANGE UP
HCO3 BLDV-SCNC: 25 MMOL/L — SIGNIFICANT CHANGE UP (ref 22–29)
HCT VFR BLD CALC: 39.5 % — SIGNIFICANT CHANGE UP (ref 37–47)
HCT VFR BLDA CALC: 43.4 % — SIGNIFICANT CHANGE UP (ref 34–44)
HGB BLD CALC-MCNC: 14.2 G/DL — SIGNIFICANT CHANGE UP (ref 14–18)
HGB BLD-MCNC: 13.4 G/DL — SIGNIFICANT CHANGE UP (ref 12–16)
IMM GRANULOCYTES NFR BLD AUTO: 0.4 % — HIGH (ref 0.1–0.3)
INR BLD: 1.21 RATIO — SIGNIFICANT CHANGE UP (ref 0.65–1.3)
KETONES UR-MCNC: NEGATIVE — SIGNIFICANT CHANGE UP
LACTATE BLDV-MCNC: 4 MMOL/L — HIGH (ref 0.5–1.6)
LACTATE SERPL-SCNC: 4.1 MMOL/L — CRITICAL HIGH (ref 0.5–2.2)
LEUKOCYTE ESTERASE UR-ACNC: ABNORMAL
LIDOCAIN IGE QN: 123 U/L — HIGH (ref 7–60)
LYMPHOCYTES # BLD AUTO: 1.44 K/UL — SIGNIFICANT CHANGE UP (ref 1.2–3.4)
LYMPHOCYTES # BLD AUTO: 19.7 % — LOW (ref 20.5–51.1)
MCHC RBC-ENTMCNC: 31.5 PG — HIGH (ref 27–31)
MCHC RBC-ENTMCNC: 33.9 G/DL — SIGNIFICANT CHANGE UP (ref 32–37)
MCV RBC AUTO: 92.7 FL — SIGNIFICANT CHANGE UP (ref 81–99)
MONOCYTES # BLD AUTO: 0.72 K/UL — HIGH (ref 0.1–0.6)
MONOCYTES NFR BLD AUTO: 9.8 % — HIGH (ref 1.7–9.3)
NEUTROPHILS # BLD AUTO: 4.92 K/UL — SIGNIFICANT CHANGE UP (ref 1.4–6.5)
NEUTROPHILS NFR BLD AUTO: 67.3 % — SIGNIFICANT CHANGE UP (ref 42.2–75.2)
NITRITE UR-MCNC: NEGATIVE — SIGNIFICANT CHANGE UP
NRBC # BLD: 0 /100 WBCS — SIGNIFICANT CHANGE UP (ref 0–0)
PCO2 BLDV: 32 MMHG — LOW (ref 41–51)
PH BLDV: 7.5 — HIGH (ref 7.26–7.43)
PH UR: 7 — SIGNIFICANT CHANGE UP (ref 5–8)
PLATELET # BLD AUTO: 284 K/UL — SIGNIFICANT CHANGE UP (ref 130–400)
PO2 BLDV: 20 MMHG — SIGNIFICANT CHANGE UP (ref 20–40)
POTASSIUM BLDV-SCNC: 4.5 MMOL/L — SIGNIFICANT CHANGE UP (ref 3.3–5.6)
POTASSIUM SERPL-MCNC: 4.6 MMOL/L — SIGNIFICANT CHANGE UP (ref 3.5–5)
POTASSIUM SERPL-SCNC: 4.6 MMOL/L — SIGNIFICANT CHANGE UP (ref 3.5–5)
PROT SERPL-MCNC: 7 G/DL — SIGNIFICANT CHANGE UP (ref 6–8)
PROT UR-MCNC: NEGATIVE MG/DL — SIGNIFICANT CHANGE UP
PROTHROM AB SERPL-ACNC: 13.9 SEC — HIGH (ref 9.95–12.87)
RBC # BLD: 4.26 M/UL — SIGNIFICANT CHANGE UP (ref 4.2–5.4)
RBC # FLD: 14.1 % — SIGNIFICANT CHANGE UP (ref 11.5–14.5)
SAO2 % BLDV: 32 % — SIGNIFICANT CHANGE UP
SODIUM SERPL-SCNC: 136 MMOL/L — SIGNIFICANT CHANGE UP (ref 135–146)
SP GR SPEC: 1.02 — SIGNIFICANT CHANGE UP (ref 1.01–1.03)
TROPONIN T SERPL-MCNC: <0.01 NG/ML — SIGNIFICANT CHANGE UP
UROBILINOGEN FLD QL: 0.2 MG/DL — SIGNIFICANT CHANGE UP (ref 0.2–0.2)
WBC # BLD: 7.32 K/UL — SIGNIFICANT CHANGE UP (ref 4.8–10.8)
WBC # FLD AUTO: 7.32 K/UL — SIGNIFICANT CHANGE UP (ref 4.8–10.8)

## 2019-06-09 PROCEDURE — 74177 CT ABD & PELVIS W/CONTRAST: CPT | Mod: 26

## 2019-06-09 PROCEDURE — 73610 X-RAY EXAM OF ANKLE: CPT | Mod: 26,RT

## 2019-06-09 PROCEDURE — 72170 X-RAY EXAM OF PELVIS: CPT | Mod: 26

## 2019-06-09 PROCEDURE — 72125 CT NECK SPINE W/O DYE: CPT | Mod: 26

## 2019-06-09 PROCEDURE — 71260 CT THORAX DX C+: CPT | Mod: 26

## 2019-06-09 PROCEDURE — 29515 APPLICATION SHORT LEG SPLINT: CPT

## 2019-06-09 PROCEDURE — 73590 X-RAY EXAM OF LOWER LEG: CPT | Mod: 26,RT

## 2019-06-09 PROCEDURE — 71045 X-RAY EXAM CHEST 1 VIEW: CPT | Mod: 26

## 2019-06-09 PROCEDURE — 99285 EMERGENCY DEPT VISIT HI MDM: CPT | Mod: 25

## 2019-06-09 PROCEDURE — 70450 CT HEAD/BRAIN W/O DYE: CPT | Mod: 26

## 2019-06-09 RX ORDER — FERROUS SULFATE 325(65) MG
325 TABLET ORAL
Refills: 0 | Status: DISCONTINUED | OUTPATIENT
Start: 2019-06-09 | End: 2019-06-13

## 2019-06-09 RX ORDER — SODIUM CHLORIDE 9 MG/ML
1000 INJECTION, SOLUTION INTRAVENOUS ONCE
Refills: 0 | Status: COMPLETED | OUTPATIENT
Start: 2019-06-09 | End: 2019-06-09

## 2019-06-09 RX ORDER — AMIODARONE HYDROCHLORIDE 400 MG/1
200 TABLET ORAL DAILY
Refills: 0 | Status: DISCONTINUED | OUTPATIENT
Start: 2019-06-09 | End: 2019-06-13

## 2019-06-09 RX ORDER — SIMVASTATIN 20 MG/1
20 TABLET, FILM COATED ORAL AT BEDTIME
Refills: 0 | Status: DISCONTINUED | OUTPATIENT
Start: 2019-06-09 | End: 2019-06-13

## 2019-06-09 RX ADMIN — SODIUM CHLORIDE 1000 MILLILITER(S): 9 INJECTION, SOLUTION INTRAVENOUS at 22:54

## 2019-06-09 NOTE — ED PROVIDER NOTE - ATTENDING CONTRIBUTION TO CARE
89 y/o F PMH DM, A fib on elequis, TIA present after a fall with possible syncopal episode (Unlikely but not witnessed). Patient lives alone and she states that she is not sure how she fell. + head trauma, no loc. now complaining of right ankle pain which is swollen. She called her family when she was on the floor and could not get up. Patient BIBEMS and was a trauma alert given her head trauma on Eliis. no headache, no dizziness, no n/v/d, no cp, no sob, no palpitations, no abd pain. pmd is Dr. Ny.    Patient seen and evaluated.  Primary survey intact. GCS 15. VS reviewed. Bedside FAST exam done and negative.  Large bore IV placed. Portable CXR and pelvis x-rays show no acute traumatic pathology.  Secondary survey shows lateral ankle swelling and tenderness. Small forehead hematoma.  Will send appropriate labs and getting appropriate trauma imaging.  Will follow up work up and recommendations from trauma.

## 2019-06-09 NOTE — ED PROVIDER NOTE - PHYSICAL EXAMINATION
CONSTITUTIONAL: Well-developed; well-nourished; in no acute distress.   SKIN: warm, dry  HEAD: Normocephalic; atraumatic.  EYES: PERRL, EOMI, no conjunctival erythema  ENT: No nasal discharge; airway clear.  NECK: Supple; non tender.  CARD: S1, S2 normal;  Regular rate and rhythm.   RESP: No wheezes, rales or rhonchi.  ABD: soft non tender, non distended, no rebound or guarding  EXT: Normal ROM.  + right ankle lateral malleolar tenderness and edema.   LYMPH: No acute cervical adenopathy.  NEURO: Alert, oriented, grossly unremarkable. neurovascularly intact

## 2019-06-09 NOTE — ED PROVIDER NOTE - CLINICAL SUMMARY MEDICAL DECISION MAKING FREE TEXT BOX
ED work up reviewed.  Concern for distal fibula fracture and patient splinted.  Work up otherwise shows no signs of serious injury. EKG reviewed.  Trauma recommends [id number] and will admit to a telemetry setting in [facility] this was syncope.   Plan of care discussed with patient and family and she will also likely require rehab evaluation to ensure safety at home and delineate what patient can do with her injury.

## 2019-06-09 NOTE — ED ADULT TRIAGE NOTE - CHIEF COMPLAINT QUOTE
" I tripped in the living room and my shoes caught in the rug and I fell." Hit head on the floor and with right ankle swelling, on Eloquis

## 2019-06-09 NOTE — H&P ADULT - HISTORY OF PRESENT ILLNESS
89y old f s/p trip and fall on rug, the patients shoe got stuck on the rug and she fell forward. +ht, -loc, +eliquis. The patient has right ankle swelling and pain. No other external signs of trauma.

## 2019-06-09 NOTE — ED ADULT NURSE NOTE - OBJECTIVE STATEMENT
s/p trip and fall on rug, pt. states she hit head on floor and swelling noted to R ankle, trauma alert called, pt. on Eliquis

## 2019-06-09 NOTE — H&P ADULT - NEUROLOGICAL
Alert & oriented; no sensory, motor or coordination deficits, normal reflexes I have reviewed and confirmed nurses' notes... negative

## 2019-06-09 NOTE — ED PROVIDER NOTE - OBJECTIVE STATEMENT
89 yo F pmh of DM, a fib on elequis, TIA present after a fall. Lives alone. States that she is not sure how she fell. + head trauma, no loc. now complaining of right ankle pain which is swollen. no headache, no dizziness, no n/v/d, no cp, no sob, no palpitations, no abd pain. pmd is Dr. Ny.

## 2019-06-09 NOTE — H&P ADULT - NSHPLABSRESULTS_GEN_ALL_CORE
Labs:  CAPILLARY BLOOD GLUCOSE      POCT Blood Glucose.: 102 mg/dL (2019 21:57)                          13.4   7.32  )-----------( 284      ( 2019 21:59 )             39.5       Auto Neutrophil %: 67.3 % (19 @ 21:59)  Auto Immature Granulocyte %: 0.4 % (19 @ 21:59)        136  |  96<L>  |  16  ----------------------------<  107<H>  4.6   |  19  |  0.9      Calcium, Total Serum: 10.3 mg/dL (19 @ 21:59)      LFTs:             7.0  | 0.3  | 16       ------------------[68      ( 2019 21:59 )  4.3  | x    | 16          Lipase:123    Amylase:x         Blood Gas Venous - Lactate: 4.0 mmoL/L (19 @ 22:13)  Lactate, Blood: 4.1 mmol/L (19 @ 21:59)      Coags:     13.90  ----< 1.21    ( 2019 21:59 )     34.6        CARDIAC MARKERS ( 2019 22:50 )  x     / <0.01 ng/mL / x     / x     / x          Urinalysis Basic - ( 2019 23:32 )    Color: Yellow / Appearance: Clear / S.020 / pH: x  Gluc: x / Ketone: Negative  / Bili: Negative / Urobili: 0.2 mg/dL   Blood: x / Protein: Negative mg/dL / Nitrite: Negative   Leuk Esterase: Small / RBC: x / WBC x   Sq Epi: x / Non Sq Epi: x / Bacteria: x        < from: CT Abdomen and Pelvis w/ IV Cont (19 @ 22:50) >    IMPRESSION:       Small pericardial effusion, unchanged since 2019.    No CT evidence of acute intra-abdominal pathology.    < end of copied text >    < from: CT Chest w/ IV Cont (19 @ 22:50) >    IMPRESSION:       Small pericardial effusion, unchanged since 2019.    No CT evidence of acute intra-abdominal pathology.    < end of copied text >    < from: CT Chest w/ IV Cont (19 @ 22:50) >      IMPRESSION:       Small pericardial effusion, unchanged since 2019.    No CT evidence of acute intra-abdominal pathology.    < end of copied text >

## 2019-06-09 NOTE — H&P ADULT - ASSESSMENT
89F s/p trip and fall on rug, +ht, -loc, +eliquis, Right lateral malleolar fracture  -Ortho consult, spoke with resident at 12:02am, they will see the patient.   -PT/Rehab  -Medicine consult 89F s/p trip and fall on rug, +ht, -loc, +eliquis, Right lateral malleolar fracture  -Ortho consult, spoke with resident at 12:02am, they will see the patient.   -PT/Rehab  -Medicine consult       Senior Trauma Resident Note  89yo  f s/p mech fall, +ht, -loc, on eliquis for afib  no external signs of trauma  ankle with lateral mal fx ortho f/u (called at 12am), splint pain control, pt rehab  pan scan neg  admit  home meds  reg diet   hosp consult   Airway intact  Bilateral Breath Sounds  Palpable pulses in 4 ext  GCS 15, PERRL, SELBY  VSS  No Subq emphysema, abdominal tenderness,  or pelvic instability   CXR and PXR negative  FAST neg  Ct findings above  Will Dispo accordingly  Plan as above d/w Dr Rosita Oseguera

## 2019-06-09 NOTE — CONSULT NOTE ADULT - ASSESSMENT
ASSESSMENT:  89y old f s/p mechanical fall, +ht, -loc, +eliquis    PLAN:    - pan scan  - cxr, pelvic xray, ankle xrays  - routine trauma labs

## 2019-06-09 NOTE — CONSULT NOTE ADULT - SUBJECTIVE AND OBJECTIVE BOX
89y f    TRAUMA ACTIVATION LEVEL:  Trauma Alert    MECHANISM OF INJURY:      [] Blunt  	[] MVC	[x] Fall	[] Pedestrian Struck	[] Motorcycle   [] Assault   [] Bicycle collision  [] Sports injury     [] Penetrating  	[] Gun Shot Wound 		[] Stab Wound    GCS: 	E: 4	V: 5	M: 6      HPI: 89y old f s/p trip and fall on rug, the patients shoe got stuck on the rug and she fell forward. +ht, -loc, +eliquis. The patient has right ankle swelling and pain. No other external signs of trauma.       PAST MEDICAL & SURGICAL HISTORY:  Atrial fibrillation  TIA (transient ischemic attack)  Diabetes mellitus  Hyperlipidemia  Hypertension      Allergies    penicillin (Anaphylaxis; Hives)    Intolerances        Home Medications:  amiodarone 200 mg oral tablet: 1 tab(s) orally once a day (19 Jan 2019 13:00)  aspirin 81 mg oral tablet: 1 tab(s) orally once a day (19 Jan 2019 13:00)  enalapril 2.5 mg oral tablet: 1 tab(s) orally once a day (19 Jan 2019 13:00)  ferrous sulfate 325 mg (65 mg elemental iron) oral tablet:  (19 Jan 2019 13:00)  glipiZIDE 5 mg oral tablet: 1 tab(s) orally every 12 hours (19 Jan 2019 13:00)  Januvia 50 mg oral tablet: 1 tab(s) orally once a day (19 Jan 2019 13:00)  metFORMIN 500 mg oral tablet: 1 tab(s) orally 2 times a day (19 Jan 2019 13:00)  simvastatin 20 mg oral tablet: 1 tab(s) orally once a day (at bedtime) (19 Jan 2019 13:00)  Vitamin B12 1000 mcg/mL injectable solution:  (19 Jan 2019 13:00)      ROS: 10-system review is otherwise negative except HPI above.      Primary Survey:    A - airway intact  B - bilateral breath sounds and good chest rise  C - palpable pulses in all extremities  D - GCS 15 on arrival, SELBY  Exposure obtained    Vital Signs Last 24 Hrs  T(C): --  T(F): --  HR: --  BP: --  BP(mean): --  RR: --  SpO2: --    Secondary Survey:   General: NAD  HEENT: Normocephalic, atraumatic, EOMI, PEERLA. no scalp lacerations   Neck: Soft, midline trachea. no cspine tenderness  Chest: No chest wall tenderness. or subq  emphysema   Cardiac: S1, S2, RRR  Respiratory: Bilateral breath sounds, clear and equal bilaterally  Abdomen: Soft, non-distended, non-tender, no rebound,   Groin: Normal appearing, pelvis stable   Ext: Right ankle deformity, palp radial b/l UE, b/l DP palp in Lower Extrem.   Back: no TTP, no palpable runoff/stepoff/deformity  Rectal: No lanette blood, KAROLYN with good tone    FAST    Procedures:    LABS:  Labs:  CAPILLARY BLOOD GLUCOSE      POCT Blood Glucose.: 102 mg/dL (09 Jun 2019 21:57)                          13.4   7.32  )-----------( 284      ( 09 Jun 2019 21:59 )             39.5       Auto Neutrophil %: 67.3 % (06-09-19 @ 21:59)  Auto Immature Granulocyte %: 0.4 % (06-09-19 @ 21:59)            LFTs:     Blood Gas Venous - Lactate: 4.0 mmoL/L (06-09-19 @ 22:13)      Coags:                        RADIOLOGY & ADDITIONAL STUDIES:  pending    ---------------------------------------------------------------------------------------

## 2019-06-09 NOTE — H&P ADULT - NSICDXPASTMEDICALHX_GEN_ALL_CORE_FT
PAST MEDICAL HISTORY:  Atrial fibrillation     Diabetes mellitus     Hyperlipidemia     Hypertension     TIA (transient ischemic attack)

## 2019-06-09 NOTE — ED ADULT NURSE NOTE - CHPI ED NUR SYMPTOMS NEG
no confusion/no numbness/no fever/no vomiting/no tingling/no weakness/no loss of consciousness/no deformity

## 2019-06-09 NOTE — ED PROVIDER NOTE - CARE PLAN
Principal Discharge DX:	Closed head injury, initial encounter  Secondary Diagnosis:	Syncope, unspecified syncope type  Secondary Diagnosis:	Fibula fracture

## 2019-06-09 NOTE — ED PROVIDER NOTE - NS ED ROS FT
Eyes:  No visual changes, eye pain or discharge.  ENMT: no sore throat or runny nose, no difficulty swallowing  Cardiac:  No chest pain  Respiratory:  No cough or respiratory distress.    GI:  No nausea, vomiting, diarrhea or abdominal pain.  :  No dysuria, frequency or burning.  MS: right ankle pain sp fall with swelling.   Neuro:  No headache or weakness. + head trauma, No LOC.  Skin:  No skin rash.   Endocrine: No history of thyroid disease or diabetes.

## 2019-06-10 LAB
ANION GAP SERPL CALC-SCNC: 10 MMOL/L — SIGNIFICANT CHANGE UP (ref 7–14)
ANION GAP SERPL CALC-SCNC: 13 MMOL/L — SIGNIFICANT CHANGE UP (ref 7–14)
BASE EXCESS BLDV CALC-SCNC: 0.8 MMOL/L — SIGNIFICANT CHANGE UP (ref -2–2)
BASOPHILS # BLD AUTO: 0.04 K/UL — SIGNIFICANT CHANGE UP (ref 0–0.2)
BASOPHILS NFR BLD AUTO: 0.5 % — SIGNIFICANT CHANGE UP (ref 0–1)
BUN SERPL-MCNC: 11 MG/DL — SIGNIFICANT CHANGE UP (ref 10–20)
BUN SERPL-MCNC: 13 MG/DL — SIGNIFICANT CHANGE UP (ref 10–20)
CA-I SERPL-SCNC: 1.21 MMOL/L — SIGNIFICANT CHANGE UP (ref 1.12–1.3)
CALCIUM SERPL-MCNC: 9 MG/DL — SIGNIFICANT CHANGE UP (ref 8.5–10.1)
CALCIUM SERPL-MCNC: 9.4 MG/DL — SIGNIFICANT CHANGE UP (ref 8.5–10.1)
CHLORIDE SERPL-SCNC: 102 MMOL/L — SIGNIFICANT CHANGE UP (ref 98–110)
CHLORIDE SERPL-SCNC: 106 MMOL/L — SIGNIFICANT CHANGE UP (ref 98–110)
CO2 SERPL-SCNC: 23 MMOL/L — SIGNIFICANT CHANGE UP (ref 17–32)
CO2 SERPL-SCNC: 24 MMOL/L — SIGNIFICANT CHANGE UP (ref 17–32)
CREAT SERPL-MCNC: 0.7 MG/DL — SIGNIFICANT CHANGE UP (ref 0.7–1.5)
CREAT SERPL-MCNC: 0.8 MG/DL — SIGNIFICANT CHANGE UP (ref 0.7–1.5)
EOSINOPHIL # BLD AUTO: 0.17 K/UL — SIGNIFICANT CHANGE UP (ref 0–0.7)
EOSINOPHIL NFR BLD AUTO: 2.1 % — SIGNIFICANT CHANGE UP (ref 0–8)
GAS PNL BLDV: 138 MMOL/L — SIGNIFICANT CHANGE UP (ref 136–145)
GAS PNL BLDV: SIGNIFICANT CHANGE UP
GLUCOSE SERPL-MCNC: 124 MG/DL — HIGH (ref 70–99)
GLUCOSE SERPL-MCNC: 148 MG/DL — HIGH (ref 70–99)
HCO3 BLDV-SCNC: 24 MMOL/L — SIGNIFICANT CHANGE UP (ref 22–29)
HCT VFR BLD CALC: 33.8 % — LOW (ref 37–47)
HCT VFR BLD CALC: 35.3 % — LOW (ref 37–47)
HCT VFR BLDA CALC: 34.6 % — SIGNIFICANT CHANGE UP (ref 34–44)
HGB BLD CALC-MCNC: 11.3 G/DL — LOW (ref 14–18)
HGB BLD-MCNC: 11.4 G/DL — LOW (ref 12–16)
HGB BLD-MCNC: 11.9 G/DL — LOW (ref 12–16)
IMM GRANULOCYTES NFR BLD AUTO: 0.4 % — HIGH (ref 0.1–0.3)
LACTATE BLDV-MCNC: 4.9 MMOL/L — HIGH (ref 0.5–1.6)
LYMPHOCYTES # BLD AUTO: 0.9 K/UL — LOW (ref 1.2–3.4)
LYMPHOCYTES # BLD AUTO: 11 % — LOW (ref 20.5–51.1)
MAGNESIUM SERPL-MCNC: 2.1 MG/DL — SIGNIFICANT CHANGE UP (ref 1.8–2.4)
MAGNESIUM SERPL-MCNC: 2.3 MG/DL — SIGNIFICANT CHANGE UP (ref 1.8–2.4)
MCHC RBC-ENTMCNC: 31.5 PG — HIGH (ref 27–31)
MCHC RBC-ENTMCNC: 31.6 PG — HIGH (ref 27–31)
MCHC RBC-ENTMCNC: 33.7 G/DL — SIGNIFICANT CHANGE UP (ref 32–37)
MCHC RBC-ENTMCNC: 33.7 G/DL — SIGNIFICANT CHANGE UP (ref 32–37)
MCV RBC AUTO: 93.4 FL — SIGNIFICANT CHANGE UP (ref 81–99)
MCV RBC AUTO: 93.6 FL — SIGNIFICANT CHANGE UP (ref 81–99)
MONOCYTES # BLD AUTO: 0.7 K/UL — HIGH (ref 0.1–0.6)
MONOCYTES NFR BLD AUTO: 8.6 % — SIGNIFICANT CHANGE UP (ref 1.7–9.3)
NEUTROPHILS # BLD AUTO: 6.31 K/UL — SIGNIFICANT CHANGE UP (ref 1.4–6.5)
NEUTROPHILS NFR BLD AUTO: 77.4 % — HIGH (ref 42.2–75.2)
NRBC # BLD: 0 /100 WBCS — SIGNIFICANT CHANGE UP (ref 0–0)
NRBC # BLD: 0 /100 WBCS — SIGNIFICANT CHANGE UP (ref 0–0)
PCO2 BLDV: 33 MMHG — LOW (ref 41–51)
PH BLDV: 7.47 — HIGH (ref 7.26–7.43)
PHOSPHATE SERPL-MCNC: 3.2 MG/DL — SIGNIFICANT CHANGE UP (ref 2.1–4.9)
PHOSPHATE SERPL-MCNC: 3.6 MG/DL — SIGNIFICANT CHANGE UP (ref 2.1–4.9)
PLATELET # BLD AUTO: 252 K/UL — SIGNIFICANT CHANGE UP (ref 130–400)
PLATELET # BLD AUTO: 254 K/UL — SIGNIFICANT CHANGE UP (ref 130–400)
PO2 BLDV: 21 MMHG — SIGNIFICANT CHANGE UP (ref 20–40)
POTASSIUM BLDV-SCNC: 4.4 MMOL/L — SIGNIFICANT CHANGE UP (ref 3.3–5.6)
POTASSIUM SERPL-MCNC: 4.1 MMOL/L — SIGNIFICANT CHANGE UP (ref 3.5–5)
POTASSIUM SERPL-MCNC: 4.2 MMOL/L — SIGNIFICANT CHANGE UP (ref 3.5–5)
POTASSIUM SERPL-SCNC: 4.1 MMOL/L — SIGNIFICANT CHANGE UP (ref 3.5–5)
POTASSIUM SERPL-SCNC: 4.2 MMOL/L — SIGNIFICANT CHANGE UP (ref 3.5–5)
RBC # BLD: 3.61 M/UL — LOW (ref 4.2–5.4)
RBC # BLD: 3.78 M/UL — LOW (ref 4.2–5.4)
RBC # FLD: 14.2 % — SIGNIFICANT CHANGE UP (ref 11.5–14.5)
RBC # FLD: 14.3 % — SIGNIFICANT CHANGE UP (ref 11.5–14.5)
SAO2 % BLDV: 35 % — SIGNIFICANT CHANGE UP
SODIUM SERPL-SCNC: 138 MMOL/L — SIGNIFICANT CHANGE UP (ref 135–146)
SODIUM SERPL-SCNC: 140 MMOL/L — SIGNIFICANT CHANGE UP (ref 135–146)
WBC # BLD: 6.95 K/UL — SIGNIFICANT CHANGE UP (ref 4.8–10.8)
WBC # BLD: 8.15 K/UL — SIGNIFICANT CHANGE UP (ref 4.8–10.8)
WBC # FLD AUTO: 6.95 K/UL — SIGNIFICANT CHANGE UP (ref 4.8–10.8)
WBC # FLD AUTO: 8.15 K/UL — SIGNIFICANT CHANGE UP (ref 4.8–10.8)
WBC UR QL: ABNORMAL /HPF

## 2019-06-10 PROCEDURE — 99223 1ST HOSP IP/OBS HIGH 75: CPT

## 2019-06-10 PROCEDURE — 93306 TTE W/DOPPLER COMPLETE: CPT | Mod: 26

## 2019-06-10 PROCEDURE — 93880 EXTRACRANIAL BILAT STUDY: CPT | Mod: 26

## 2019-06-10 RX ORDER — HEPARIN SODIUM 5000 [USP'U]/ML
5000 INJECTION INTRAVENOUS; SUBCUTANEOUS EVERY 8 HOURS
Refills: 0 | Status: DISCONTINUED | OUTPATIENT
Start: 2019-06-10 | End: 2019-06-10

## 2019-06-10 RX ORDER — CHLORHEXIDINE GLUCONATE 213 G/1000ML
1 SOLUTION TOPICAL
Refills: 0 | Status: DISCONTINUED | OUTPATIENT
Start: 2019-06-10 | End: 2019-06-13

## 2019-06-10 RX ORDER — IBUPROFEN 200 MG
400 TABLET ORAL EVERY 6 HOURS
Refills: 0 | Status: DISCONTINUED | OUTPATIENT
Start: 2019-06-10 | End: 2019-06-10

## 2019-06-10 RX ORDER — OXYCODONE HYDROCHLORIDE 5 MG/1
5 TABLET ORAL EVERY 6 HOURS
Refills: 0 | Status: DISCONTINUED | OUTPATIENT
Start: 2019-06-10 | End: 2019-06-13

## 2019-06-10 RX ORDER — ASPIRIN/CALCIUM CARB/MAGNESIUM 324 MG
81 TABLET ORAL DAILY
Refills: 0 | Status: DISCONTINUED | OUTPATIENT
Start: 2019-06-10 | End: 2019-06-13

## 2019-06-10 RX ORDER — ACETAMINOPHEN 500 MG
650 TABLET ORAL EVERY 6 HOURS
Refills: 0 | Status: DISCONTINUED | OUTPATIENT
Start: 2019-06-10 | End: 2019-06-12

## 2019-06-10 RX ORDER — PANTOPRAZOLE SODIUM 20 MG/1
40 TABLET, DELAYED RELEASE ORAL
Refills: 0 | Status: DISCONTINUED | OUTPATIENT
Start: 2019-06-10 | End: 2019-06-13

## 2019-06-10 RX ORDER — APIXABAN 2.5 MG/1
2.5 TABLET, FILM COATED ORAL EVERY 12 HOURS
Refills: 0 | Status: DISCONTINUED | OUTPATIENT
Start: 2019-06-10 | End: 2019-06-13

## 2019-06-10 RX ADMIN — CHLORHEXIDINE GLUCONATE 1 APPLICATION(S): 213 SOLUTION TOPICAL at 11:54

## 2019-06-10 RX ADMIN — Medication 325 MILLIGRAM(S): at 17:21

## 2019-06-10 RX ADMIN — Medication 650 MILLIGRAM(S): at 17:20

## 2019-06-10 RX ADMIN — Medication 650 MILLIGRAM(S): at 12:00

## 2019-06-10 RX ADMIN — Medication 2.5 MILLIGRAM(S): at 05:46

## 2019-06-10 RX ADMIN — Medication 650 MILLIGRAM(S): at 05:46

## 2019-06-10 RX ADMIN — Medication 325 MILLIGRAM(S): at 05:46

## 2019-06-10 RX ADMIN — APIXABAN 2.5 MILLIGRAM(S): 2.5 TABLET, FILM COATED ORAL at 17:21

## 2019-06-10 RX ADMIN — PANTOPRAZOLE SODIUM 40 MILLIGRAM(S): 20 TABLET, DELAYED RELEASE ORAL at 08:29

## 2019-06-10 RX ADMIN — Medication 81 MILLIGRAM(S): at 11:54

## 2019-06-10 RX ADMIN — SIMVASTATIN 20 MILLIGRAM(S): 20 TABLET, FILM COATED ORAL at 21:55

## 2019-06-10 RX ADMIN — APIXABAN 2.5 MILLIGRAM(S): 2.5 TABLET, FILM COATED ORAL at 05:46

## 2019-06-10 RX ADMIN — Medication 650 MILLIGRAM(S): at 11:54

## 2019-06-10 NOTE — CONSULT NOTE ADULT - SUBJECTIVE AND OBJECTIVE BOX
HPI:  89y old f s/p trip and fall on rug, the patients shoe got stuck on the rug and she fell forward. +ht, -loc, +eliquis. The patient has right ankle swelling and pain. No other external signs of trauma. (09 Jun 2019 23:53)    PAST MEDICAL & SURGICAL HISTORY:  Atrial fibrillation  TIA (transient ischemic attack)  Diabetes mellitus  Hyperlipidemia  Hypertension    MEDICATIONS  (STANDING):  acetaminophen   Tablet .. 650 milliGRAM(s) Oral every 6 hours  amiodarone    Tablet 200 milliGRAM(s) Oral daily  apixaban 2.5 milliGRAM(s) Oral every 12 hours  aspirin  chewable 81 milliGRAM(s) Oral daily  chlorhexidine 4% Liquid 1 Application(s) Topical <User Schedule>  enalapril 2.5 milliGRAM(s) Oral daily  ferrous    sulfate 325 milliGRAM(s) Oral two times a day  pantoprazole    Tablet 40 milliGRAM(s) Oral before breakfast  simvastatin 20 milliGRAM(s) Oral at bedtime    MEDICATIONS  (PRN):  oxyCODONE    IR 5 milliGRAM(s) Oral every 6 hours PRN Severe Pain (7 - 10)                            11.9   8.15  )-----------( 252      ( 10 Harsha 2019 06:43 )             35.3   06-10    138  |  102  |  13  ----------------------------<  124<H>  4.1   |  23  |  0.8    Ca    9.0      10 Harsha 2019 06:43  Phos  3.2     06-10  Mg     2.1     06-10    TPro  7.0  /  Alb  4.3  /  TBili  0.3  /  DBili  x   /  AST  16  /  ALT  16  /  AlkPhos  68  06-09    < from: Xray Ankle Complete 3 Views, Right (06.09.19 @ 22:18) >    EXAM:  XR ANKLE COMP MIN 3 VIEWS RT            PROCEDURE DATE:  06/09/2019      INTERPRETATION:  Clinical History / Reason for exam: Ankle pain.    Technique:  Three radiographs of the right ankle are obtained.    Comparison:  No studies are available for comparison.    Findings:    Acute, nondisplaced fracture of distal right fibula. Visualized tibia   without fracture. Vascular calcifications. Degenerative change of hind   and midfoot. Small calcaneal heel spur.    Impression:  Acute, nondisplaced fracture of distal right fibula.      FOREIGN KULKARNI M.D., ATTENDING RADIOLOGIST    < end of copied text >      On physical exam:   The patient is awake and alert in NAD  Right lower extremity with posterior splint, toes warm sensate and mobile      A/P: Right nondisplaced lat mal fx          NWB RLE          follow up with ortho as outpt Dr. Grover [phone]

## 2019-06-10 NOTE — CONSULT NOTE ADULT - SUBJECTIVE AND OBJECTIVE BOX
Patient is a 89y old  Female who presents with a chief complaint of s/p trip and fall, +ht, -loc, +eliquis (10 Harsha 2019 11:25)    HPI:  89y old f s/p trip and fall on rug, the patients shoe got stuck on the rug and she fell forward. +ht, -loc, +eliquis. The patient has right ankle swelling and pain. No other external signs of trauma. (2019 23:53)      PAST MEDICAL & SURGICAL HISTORY:  Atrial fibrillation  TIA (transient ischemic attack)  Diabetes mellitus  Hyperlipidemia  Hypertension      Hospital Course: Xrays with R Distal fibular fx- In splint NWB- Ortho to see- Syncope MATIAS in progress    TODAY'S SUBJECTIVE & REVIEW OF SYMPTOMS:     Constitutional WNL   Cardio WNL   Resp WNL   GI WNL  Heme WNL  Endo WNL  Skin WNL  MSK WNL  Neuro WNL  Cognitive WNL  Psych WNL      MEDICATIONS  (STANDING):  acetaminophen   Tablet .. 650 milliGRAM(s) Oral every 6 hours  amiodarone    Tablet 200 milliGRAM(s) Oral daily  apixaban 2.5 milliGRAM(s) Oral every 12 hours  aspirin  chewable 81 milliGRAM(s) Oral daily  chlorhexidine 4% Liquid 1 Application(s) Topical <User Schedule>  enalapril 2.5 milliGRAM(s) Oral daily  ferrous    sulfate 325 milliGRAM(s) Oral two times a day  pantoprazole    Tablet 40 milliGRAM(s) Oral before breakfast  simvastatin 20 milliGRAM(s) Oral at bedtime    MEDICATIONS  (PRN):  oxyCODONE    IR 5 milliGRAM(s) Oral every 6 hours PRN Severe Pain (7 - 10)      FAMILY HISTORY:      Allergies    penicillin (Anaphylaxis; Hives)    Intolerances        SOCIAL HISTORY:    [    ] Etoh  [    ] Smoking  [    ] Substance abuse     Home Environment:  [  x  ] Home Alone  [    ] Lives with Family  [    ] Home Health Aid    Dwelling:  [    ] Apartment  [  x  ] Private House  [    ] Adult Home  [    ] Skilled Nursing Facility      [    ] Short Term  [    ] Long Term  [  x  ] Stairs                           [    ] Elevator     FUNCTIONAL STATUS PTA: (Check all that apply)  Ambulation: [    x ]Independent    [    ] Dependent     [    ] Non-Ambulatory  Assistive Device: [    ] SA Cane  [    ]  Q Cane  [    ] Walker  [    ]  Wheelchair  ADL : [   x ] Independent  [    ]  Dependent       Vital Signs Last 24 Hrs  T(C): 36.7 (10 Harsha 2019 05:52), Max: 36.7 (10 Harsha 2019 05:52)  T(F): 98 (10 Harsha 2019 05:52), Max: 98 (10 Harsha 2019 05:52)  HR: 58 (10 Harsha 2019 05:43) (58 - 84)  BP: 140/69 (10 Harsha 2019 05:43) (140/69 - 176/86)  BP(mean): --  RR: 15 (10 Harsha 2019 05:43) (15 - 19)  SpO2: 95% (10 Harsha 2019 07:35) (95% - 100%)      PHYSICAL EXAM: Alert & Oriented X3  GENERAL: NAD, well-groomed, well-developed  HEAD:  Atraumatic, Normocephalic  EYES: EOMI, PERRLA, conjunctiva and sclera clear  NECK: Supple  CHEST/LUNG: Clear bilaterally  HEART: Regular rate and rhythm  ABDOMEN: Soft, Nontender, Nondistended; Bowel sounds present  EXTREMITIES:  no calf tenderness,no edema BLES RLE in sPlint    NERVOUS SYSTEM:  Cranial Nerves 2-12 intact [x    ] Abnormal  [    ]  ROM: WFL all extremities [    ]  Abnormal [  x   ]except R ankle  Motor Strength: WFL all extremities  [ x   ]  Abnormal [    ]  Sensation: intact to light touch [   x ] Abnormal [    ]      FUNCTIONAL STATUS:  Bed Mobility: [   ]  Independent [    ]  Supervision [ x   ]  Needs Assistance [  ]  N/A  Transfers: [    ]  Independent [    ]  Supervision [ x   ]  Needs Assistance [    ]  N/A    Ambulation:  [    ]  Independent [    ]  Supervision [  x  ]  Needs Assistance [    ]  N/A   ADL:  [    ]   Independent [  x  ] Requires Assistance [    ] N/A       LABS:                        11.9   8.15  )-----------( 252      ( 10 Harsha 2019 06:43 )             35.3     06-10    138  |  102  |  13  ----------------------------<  124<H>  4.1   |  23  |  0.8    Ca    9.0      10 Harsha 2019 06:43  Phos  3.2     06-10  Mg     2.1     06-10    TPro  7.0  /  Alb  4.3  /  TBili  0.3  /  DBili  x   /  AST  16  /  ALT  16  /  AlkPhos  68  06-09    PT/INR - ( 2019 21:59 )   PT: 13.90 sec;   INR: 1.21 ratio         PTT - ( 2019 21:59 )  PTT:34.6 sec  Urinalysis Basic - ( 2019 23:32 )    Color: Yellow / Appearance: Clear / S.020 / pH: x  Gluc: x / Ketone: Negative  / Bili: Negative / Urobili: 0.2 mg/dL   Blood: x / Protein: Negative mg/dL / Nitrite: Negative   Leuk Esterase: Small / RBC: x / WBC 6-10 /HPF   Sq Epi: x / Non Sq Epi: x / Bacteria: x        RADIOLOGY & ADDITIONAL STUDIES:

## 2019-06-10 NOTE — PHYSICAL THERAPY INITIAL EVALUATION ADULT - GAIT DISTANCE, PT EVAL
Pt took 1 very small hop forward however with substantial difficulty requiring PT max assist due to poor ability to dynamically maintain NWB RLE

## 2019-06-10 NOTE — PROGRESS NOTE ADULT - ASSESSMENT
89y old f s/p mechanical fall, +ht, -loc, +eliquis with fibular fracture    PLAN:    -pain control  -ortho  -DVT ppx  -Hold AC  -diet

## 2019-06-10 NOTE — PHYSICAL THERAPY INITIAL EVALUATION ADULT - IMPAIRMENTS FOUND, PT EVAL
ergonomics and body mechanics/gait, locomotion, and balance/joint integrity and mobility/muscle strength

## 2019-06-10 NOTE — CONSULT NOTE ADULT - ASSESSMENT
IMPRESSION: Rehab of gait ab fall R ankle fx    PRECAUTIONS: [x    ] Cardiac  [    ] Respiratory  [  x  ] Seizures [    ] Contact Isolation  [    ] Droplet Isolation  [    ] Other    Weight Bearing Status: NWB RLE    RECOMMENDATION:    Out of Bed to Chair     DVT/Decubiti Prophylaxis    REHAB PLAN:     [  x   ] Bedside P/T 3-5 times a week   [     ] Bedside O/T  2-3 times a week   [     ] No Rehab Therapy Indicated   [     ]  Speech Therapy   Conditioning/ROM                                 ADL  Bed Mobility                                            Conditioning/ROM  Transfers                                                  Bed Mobility  Sitting /Standing Balance                      Transfers                                        Gait Training                                            Sitting/Standing Balance  Stair Training [   ]Applicable                 Home equipment Eval                                                                     Splinting  [   ] Only      GOALS:   ADL   [   x ]   Independent         Transfers  [x    ] Independent            Ambulation  [ x    ] Independent     [ x    ] With device                            [    ]  CG                                               [    ]  CG                                                    [     ] CG                            [    ] Min A                                          [    ] Min A                                                [     ] Min  A          DISCHARGE PLAN:   [     ]  Good candidate for Intensive Rehabilitation/Hospital based                                             Will tolerate 3hrs Intensive Rehab Daily                                       [    x  ]  Short Term Rehab in Skilled Nursing Facility                                       [      ]  Home with Outpatient or  services                                         [      ]  Possible Candidate for Intensive Hospital based Rehab

## 2019-06-10 NOTE — PHYSICAL THERAPY INITIAL EVALUATION ADULT - PERTINENT HX OF CURRENT PROBLEM, REHAB EVAL
89y old f s/p trip and fall on rug, the patients shoe got stuck on the rug and she fell forward. +ht, -loc, +eliquis. The patient has right ankle swelling and pain.

## 2019-06-10 NOTE — PROGRESS NOTE ADULT - SUBJECTIVE AND OBJECTIVE BOX
GENERAL SURGERY PROGRESS NOTE     BUNNY HAMILTONN  89y  Female  [facility]  POD:  Procedure:   OVERNIGHT EVENTS:  no acute events.    T(F): 98 (06-10-19 @ 05:52), Max: 98 (06-10-19 @ 05:52)  HR: 58 (06-10-19 @ 05:43) (58 - 84)  BP: 140/69 (06-10-19 @ 05:43) (140/69 - 176/86)  RR: 15 (06-10-19 @ 05:43) (15 - 19)  SpO2: 95% (06-10-19 @ 07:35) (95% - 100%)    DIET/FLUIDS: ferrous    sulfate 325 milliGRAM(s) Oral two times a day    GI proph:  pantoprazole    Tablet 40 milliGRAM(s) Oral before breakfast    AC/ proph: aspirin  chewable 81 milliGRAM(s) Oral daily    ABx:     PHYSICAL EXAM:  GENERAL: NAD  CHEST/LUNG: Clear to auscultation bilaterally  HEART: Regular rate and rhythm  ABDOMEN: Soft, Nontender, Nondistended;   EXTREMITIES:  No clubbing, cyanosis, or edema      LABS  Labs:  CAPILLARY BLOOD GLUCOSE      POCT Blood Glucose.: 102 mg/dL (2019 21:57)                          11.9   8.15  )-----------( 252      ( 10 Harsha 2019 06:43 )             35.3       Auto Neutrophil %: 77.4 % (06-10-19 @ 06:43)  Auto Immature Granulocyte %: 0.4 % (06-10-19 @ 06:43)  Auto Neutrophil %: 67.3 % (19 @ 21:59)  Auto Immature Granulocyte %: 0.4 % (19 @ 21:59)    06-10    138  |  102  |  13  ----------------------------<  124<H>  4.1   |  23  |  0.8      Calcium, Total Serum: 9.0 mg/dL (06-10-19 @ 06:43)      LFTs:             7.0  | 0.3  | 16       ------------------[68      ( 2019 21:59 )  4.3  | x    | 16          Lipase:123         Blood Gas Venous - Lactate: 4.9 mmoL/L (19 @ 23:48)  Blood Gas Venous - Lactate: 4.0 mmoL/L (19 @ 22:13)  Lactate, Blood: 4.1 mmol/L (19 @ 21:59)      Coags:     13.90  ----< 1.21    ( 2019 21:59 )     34.6        CARDIAC MARKERS ( 2019 22:50 )  x     / <0.01 ng/mL / x     / x     / x            Alcohol, Blood: <10 mg/dL (19 @ 21:59)    Urinalysis Basic - ( 2019 23:32 )    Color: Yellow / Appearance: Clear / S.020 / pH: x  Gluc: x / Ketone: Negative  / Bili: Negative / Urobili: 0.2 mg/dL   Blood: x / Protein: Negative mg/dL / Nitrite: Negative   Leuk Esterase: Small / RBC: x / WBC 6-10 /HPF   Sq Epi: x / Non Sq Epi: x / Bacteria: x        RADIOLOGY & ADDITIONAL TESTS:  < from: Xray Tibia + Fibula 2 Views, Right (19 @ 23:32) >    Findings:    Acute, nondisplaced right distal fibula fracture. Left tibia   unremarkable. Vascular calcifications.    Impression:  Acute, nondisplaced right distal fibular fracture.      < end of copied text >      A/P

## 2019-06-11 LAB
ANION GAP SERPL CALC-SCNC: 13 MMOL/L — SIGNIFICANT CHANGE UP (ref 7–14)
BUN SERPL-MCNC: 14 MG/DL — SIGNIFICANT CHANGE UP (ref 10–20)
CALCIUM SERPL-MCNC: 9.4 MG/DL — SIGNIFICANT CHANGE UP (ref 8.5–10.1)
CHLORIDE SERPL-SCNC: 96 MMOL/L — LOW (ref 98–110)
CO2 SERPL-SCNC: 24 MMOL/L — SIGNIFICANT CHANGE UP (ref 17–32)
CREAT SERPL-MCNC: 1 MG/DL — SIGNIFICANT CHANGE UP (ref 0.7–1.5)
CULTURE RESULTS: SIGNIFICANT CHANGE UP
GLUCOSE SERPL-MCNC: 215 MG/DL — HIGH (ref 70–99)
HCT VFR BLD CALC: 36.9 % — LOW (ref 37–47)
HGB BLD-MCNC: 12.2 G/DL — SIGNIFICANT CHANGE UP (ref 12–16)
MAGNESIUM SERPL-MCNC: 2.4 MG/DL — SIGNIFICANT CHANGE UP (ref 1.8–2.4)
MCHC RBC-ENTMCNC: 31.1 PG — HIGH (ref 27–31)
MCHC RBC-ENTMCNC: 33.1 G/DL — SIGNIFICANT CHANGE UP (ref 32–37)
MCV RBC AUTO: 94.1 FL — SIGNIFICANT CHANGE UP (ref 81–99)
NRBC # BLD: 0 /100 WBCS — SIGNIFICANT CHANGE UP (ref 0–0)
PHOSPHATE SERPL-MCNC: 3.6 MG/DL — SIGNIFICANT CHANGE UP (ref 2.1–4.9)
PLATELET # BLD AUTO: 263 K/UL — SIGNIFICANT CHANGE UP (ref 130–400)
POTASSIUM SERPL-MCNC: 4.7 MMOL/L — SIGNIFICANT CHANGE UP (ref 3.5–5)
POTASSIUM SERPL-SCNC: 4.7 MMOL/L — SIGNIFICANT CHANGE UP (ref 3.5–5)
RBC # BLD: 3.92 M/UL — LOW (ref 4.2–5.4)
RBC # FLD: 14.5 % — SIGNIFICANT CHANGE UP (ref 11.5–14.5)
SODIUM SERPL-SCNC: 133 MMOL/L — LOW (ref 135–146)
SPECIMEN SOURCE: SIGNIFICANT CHANGE UP
WBC # BLD: 6.57 K/UL — SIGNIFICANT CHANGE UP (ref 4.8–10.8)
WBC # FLD AUTO: 6.57 K/UL — SIGNIFICANT CHANGE UP (ref 4.8–10.8)

## 2019-06-11 PROCEDURE — 99232 SBSQ HOSP IP/OBS MODERATE 35: CPT

## 2019-06-11 RX ADMIN — CHLORHEXIDINE GLUCONATE 1 APPLICATION(S): 213 SOLUTION TOPICAL at 05:39

## 2019-06-11 RX ADMIN — Medication 650 MILLIGRAM(S): at 05:39

## 2019-06-11 RX ADMIN — Medication 325 MILLIGRAM(S): at 18:13

## 2019-06-11 RX ADMIN — AMIODARONE HYDROCHLORIDE 200 MILLIGRAM(S): 400 TABLET ORAL at 05:38

## 2019-06-11 RX ADMIN — Medication 650 MILLIGRAM(S): at 18:13

## 2019-06-11 RX ADMIN — Medication 81 MILLIGRAM(S): at 11:36

## 2019-06-11 RX ADMIN — Medication 650 MILLIGRAM(S): at 11:36

## 2019-06-11 RX ADMIN — PANTOPRAZOLE SODIUM 40 MILLIGRAM(S): 20 TABLET, DELAYED RELEASE ORAL at 05:38

## 2019-06-11 RX ADMIN — APIXABAN 2.5 MILLIGRAM(S): 2.5 TABLET, FILM COATED ORAL at 18:13

## 2019-06-11 RX ADMIN — Medication 325 MILLIGRAM(S): at 05:38

## 2019-06-11 RX ADMIN — Medication 650 MILLIGRAM(S): at 23:46

## 2019-06-11 RX ADMIN — SIMVASTATIN 20 MILLIGRAM(S): 20 TABLET, FILM COATED ORAL at 22:57

## 2019-06-11 RX ADMIN — Medication 2.5 MILLIGRAM(S): at 05:38

## 2019-06-11 RX ADMIN — Medication 650 MILLIGRAM(S): at 19:44

## 2019-06-11 RX ADMIN — APIXABAN 2.5 MILLIGRAM(S): 2.5 TABLET, FILM COATED ORAL at 05:38

## 2019-06-11 NOTE — PROGRESS NOTE ADULT - SUBJECTIVE AND OBJECTIVE BOX
Progress Note: Surgery  Patient: ALBARO HAMILTON , 89y (16-Sep-1929)Female   MRN: [id number]  Location: [address]  Visit: 06-10-19 Inpatient  Date: 06-11-19 @ 01:17    Procedure/Injury: right medial malleolar fracture    Events over 24h: RAJEEV, afebrile, vitals wnl, ortho provided recs for o/p f/u     Vitals: T(F): 97.4 (06-10-19 @ 20:33), Max: 98 (06-10-19 @ 05:52)  HR: 60 (06-10-19 @ 20:33)  BP: 144/66 (06-10-19 @ 20:33) (140/69 - 171/74)  RR: 16 (06-10-19 @ 20:33)  SpO2: 95% (06-10-19 @ 07:35)    Diet: Diet, DASH/TLC:   Sodium & Cholesterol Restricted (06-10-19 @ 00:14)    In:   06-10-19 @ 07:01  -  06-11-19 @ 01:17  --------------------------------------------------------  IN: 250 mL      Out:   06-10-19 @ 07:01  -  06-11-19 @ 01:17  --------------------------------------------------------  OUT:    Voided: 600 mL  Total OUT: 600 mL        Net:   06-10-19 @ 07:01  -  06-11-19 @ 01:17  --------------------------------------------------------  NET: -350 mL    PHYSICAL EXAM:  GENERAL: NAD  CHEST/LUNG: Clear to auscultation bilaterally  HEART: Regular rate and rhythm  ABDOMEN: Soft, Nontender, Nondistended;   EXTREMITIES:  No clubbing, cyanosis, or edema, splint in place      Medications: [Standing]  acetaminophen   Tablet .. 650 milliGRAM(s) Oral every 6 hours  amiodarone    Tablet 200 milliGRAM(s) Oral daily  apixaban 2.5 milliGRAM(s) Oral every 12 hours  aspirin  chewable 81 milliGRAM(s) Oral daily  chlorhexidine 4% Liquid 1 Application(s) Topical <User Schedule>  enalapril 2.5 milliGRAM(s) Oral daily  ferrous    sulfate 325 milliGRAM(s) Oral two times a day  pantoprazole    Tablet 40 milliGRAM(s) Oral before breakfast  simvastatin 20 milliGRAM(s) Oral at bedtime    Medications:[PRN]  oxyCODONE    IR 5 milliGRAM(s) Oral every 6 hours PRN    Labs:                        11.4   6.95  )-----------( 254      ( 10 Harsha 2019 21:13 )             33.8     06-10    140  |  106  |  11  ----------------------------<  148<H>  4.2   |  24  |  0.7    Ca    9.4      10 Harsha 2019 21:13  Phos  3.6     06-10  Mg     2.3     06-10    TPro  7.0  /  Alb  4.3  /  TBili  0.3  /  DBili  x   /  AST  16  /  ALT  16  /  AlkPhos  68  06-09    LIVER FUNCTIONS - ( 09 Jun 2019 21:59 )  Alb: 4.3 g/dL / Pro: 7.0 g/dL / ALK PHOS: 68 U/L / ALT: 16 U/L / AST: 16 U/L / GGT: x           PT/INR - ( 09 Jun 2019 21:59 )   PT: 13.90 sec;   INR: 1.21 ratio    PTT - ( 09 Jun 2019 21:59 )  PTT:34.6 sec    CARDIAC MARKERS ( 09 Jun 2019 22:50 )  x     / <0.01 ng/mL / x     / x     / x        Date/Time: 06-11-19 @ 01:17

## 2019-06-11 NOTE — PROGRESS NOTE ADULT - ASSESSMENT
89y old f s/p mechanical fall, +ht, -loc, +eliquis with fibular fracture    PLAN:    -pain control  -ortho rec: NWB RLE, op f/u   -DVT ppx  -diet  - SW for dispo

## 2019-06-11 NOTE — CHART NOTE - NSCHARTNOTEFT_GEN_A_CORE
GENERAL SURGERY FLOOR TRANSFER NOTE    89y Female transferred to medicine from trauma service.    SUBJECTIVE:  Patient stable, cleared from trauma service.    Denies pain  No SoB, no chest pain      CLOSED HEAD INJURY,INITIAL ENCOUNTER,SYNCOPE,UNSPECIFIED SYNCOPE TYPE,FIBU  ^FALL HIT HEAD ON BLOOD THINNERS R ANKLE INJURY  Handoff  MEWS Score  Atrial fibrillation  TIA (transient ischemic attack)  Diabetes mellitus  Hyperlipidemia  Hypertension  Closed head injury, initial encounter  FALL HIT HEAD ON BLOOD THINNERS R ANKLE INJURY  90+  Fibula fracture  Syncope, unspecified syncope type    penicillin (Anaphylaxis; Hives)    acetaminophen   Tablet .. 650 milliGRAM(s) Oral every 6 hours  amiodarone    Tablet 200 milliGRAM(s) Oral daily  apixaban 2.5 milliGRAM(s) Oral every 12 hours  aspirin  chewable 81 milliGRAM(s) Oral daily  chlorhexidine 4% Liquid 1 Application(s) Topical <User Schedule>  enalapril 2.5 milliGRAM(s) Oral daily  ferrous    sulfate 325 milliGRAM(s) Oral two times a day  oxyCODONE    IR 5 milliGRAM(s) Oral every 6 hours PRN  pantoprazole    Tablet 40 milliGRAM(s) Oral before breakfast  simvastatin 20 milliGRAM(s) Oral at bedtime      OBJECTIVE:    T(C): 35.8 (06-11-19 @ 05:27), Max: 36.3 (06-10-19 @ 20:33)  HR: 63 (06-11-19 @ 05:27) (60 - 63)  BP: 169/72 (06-11-19 @ 05:27) (144/66 - 171/74)  RR: 16 (06-11-19 @ 05:27) (16 - 16)  SpO2: 96% (06-11-19 @ 07:36) (96% - 96%)    I&O's Summary    10 Harsha 2019 07:01  -  11 Jun 2019 07:00  --------------------------------------------------------  IN: 250 mL / OUT: 600 mL / NET: -350 mL                          11.4   6.95  )-----------( 254      ( 10 Harsha 2019 21:13 )             33.8       06-10    140  |  106  |  11  ----------------------------<  148<H>  4.2   |  24  |  0.7    Ca    9.4      10 Harsha 2019 21:13  Phos  3.6     06-10  Mg     2.3     06-10    TPro  7.0  /  Alb  4.3  /  TBili  0.3  /  DBili  x   /  AST  16  /  ALT  16  /  AlkPhos  68  06-09      MEDICATIONS  (STANDING):  acetaminophen   Tablet .. 650 milliGRAM(s) Oral every 6 hours  amiodarone    Tablet 200 milliGRAM(s) Oral daily  apixaban 2.5 milliGRAM(s) Oral every 12 hours  aspirin  chewable 81 milliGRAM(s) Oral daily  chlorhexidine 4% Liquid 1 Application(s) Topical <User Schedule>  enalapril 2.5 milliGRAM(s) Oral daily  ferrous    sulfate 325 milliGRAM(s) Oral two times a day  pantoprazole    Tablet 40 milliGRAM(s) Oral before breakfast  simvastatin 20 milliGRAM(s) Oral at bedtime    MEDICATIONS  (PRN):  oxyCODONE    IR 5 milliGRAM(s) Oral every 6 hours PRN Severe Pain (7 - 10)      PHYSICAL EXAM:    Secondary Survey:   General: NAD  HEENT: Normocephalic, atraumatic, EOMI, PEERLA. no scalp lacerations   Neck: Soft, midline trachea. no cspine tenderness  Chest: No chest wall tenderness. or subq  emphysema   Cardiac: S1, S2, RRR  Respiratory: Bilateral breath sounds, clear and equal bilaterally  Abdomen: Soft, non-distended, non-tender, no rebound,   Groin: Normal appearing, pelvis stable   Ext: Right ankle deformity, palp radial b/l UE, b/l DP palp in Lower Extrem. Splinted.    fibular fracture      Complete Blood Count: 20:00 (06-11-19 @ 05:51)  Basic Metabolic Panel: 20:00 (06-11-19 @ 05:51)  Magnesium, Serum: 20:00 (06-11-19 @ 05:51)  Phosphorus Level, Serum: 20:00 (06-11-19 @ 05:51)        89y old f s/p mechanical fall, +ht, -loc, +eliquis with fibular fracture  Transferred to medical service for continued medical workup  Cleared from trauma  Orthopaedics on case       PLAN:    -F/u neurology given EEG findings  -f/u cardiology given aortic stenosis  -F/u with PCP  -pain control  -ortho  -DVT ppx  -on eliquis  -diet  -f/u dispo

## 2019-06-11 NOTE — PROGRESS NOTE ADULT - SUBJECTIVE AND OBJECTIVE BOX
ALBARO HAMILTON  89y  Female      Patient is a 89y old  Female who presents with a chief complaint of s/p trip and fall, +ht, -loc, +eliquis (11 Jun 2019 01:17).  Admitted initially to hospitalist service in error.  S/P ortho eval for malleoalr fx      INTERVAL HPI/OVERNIGHT EVENTS:   No overnight events    HEALTH ISSUES - PROBLEM Dx:  Atrial fibrillation     Diabetes mellitus     Hyperlipidemia     Hypertension     TIA (transient ischemic attack).            Vital Signs Last 24 Hrs  T(C): 35.8 (11 Jun 2019 05:27), Max: 36.3 (10 Harsha 2019 20:33)  T(F): 96.4 (11 Jun 2019 05:27), Max: 97.4 (10 Harsha 2019 20:33)  HR: 63 (11 Jun 2019 05:27) (60 - 63)  BP: 169/72 (11 Jun 2019 05:27) (144/66 - 171/74)  BP(mean): --  RR: 16 (11 Jun 2019 05:27) (16 - 16)  SpO2: 96% (11 Jun 2019 07:36) (96% - 96%)    acetaminophen   Tablet .. 650 milliGRAM(s) Oral every 6 hours  amiodarone    Tablet 200 milliGRAM(s) Oral daily  apixaban 2.5 milliGRAM(s) Oral every 12 hours  aspirin  chewable 81 milliGRAM(s) Oral daily  chlorhexidine 4% Liquid 1 Application(s) Topical <User Schedule>  enalapril 2.5 milliGRAM(s) Oral daily  ferrous    sulfate 325 milliGRAM(s) Oral two times a day  oxyCODONE    IR 5 milliGRAM(s) Oral every 6 hours PRN  pantoprazole    Tablet 40 milliGRAM(s) Oral before breakfast  simvastatin 20 milliGRAM(s) Oral at bedtime      PHYSICAL EXAM:  GENERAL: NAD, well-groomed, well-developed, elderly female, alert, responsive  HEAD:  Atraumatic, Normocephalic  EYES: EOMI, PERRLA, conjunctiva and sclera clear  HEENT: No tonsillar erythema, exudates, or enlargement; Moist mucous membranes, Good dentition, No lesions  NECK: Supple, No JVD, Normal thyroid  NERVOUS SYSTEM:  Alert & Oriented X3, Good concentration; Motor Strength 5/5 B/L upper and lower extremities; DTRs 2+ intact and symmetric  CHEST/LUNG: Clear to percussion bilaterally; No rales, rhonchi, wheezing, or rubs  HEART: Regular rate and rhythm; No murmurs, rubs, or gallops  ABDOMEN: Soft, Nontender, Nondistended; Bowel sounds present  EXTREMITIES:  2+ Peripheral Pulses, No clubbing, cyanosis, LE casted, no edema  LYMPH: No lymphadenopathy noted  SKIN: No rashes or lesions      LABS                         11.4   6.95  )-----------( 254      ( 10 Harsha 2019 21:13 )             33.8     06-10    140  |  106  |  11  ----------------------------<  148<H>  4.2   |  24  |  0.7    Ca    9.4      10 Harsha 2019 21:13  Phos  3.6     06-10  Mg     2.3     06-10    TPro  7.0  /  Alb  4.3  /  TBili  0.3  /  DBili  x   /  AST  16  /  ALT  16  /  AlkPhos  68  06-09      RADIOLOGY & ADDITIONAL TESTS:    ASSESSMENT & PLAN:     89F s/p trip and fall on rug, +ht, -loc, +eliquis, Right lateral malleolar fracture  -Ortho consult noted, casted, NWB, PT eval     -DM, doing well on Januvia    -A Fib, continue Eliquis, rate controlled, on Amiodarone    -Hx TIA, continue current meds    -Hyperlipidemia - continue statin ALBARO HAMILTON  89y  Female      Patient is a 89y old  Female who presents with a chief complaint of s/p trip and fall, +ht, -loc, +eliquis (11 Jun 2019 01:17).  Admitted initially to hospitalist service in error.  S/P ortho eval for malleoalr fx      INTERVAL HPI/OVERNIGHT EVENTS:   No overnight events    HEALTH ISSUES - PROBLEM Dx:  Atrial fibrillation     Diabetes mellitus     Hyperlipidemia     Hypertension     TIA (transient ischemic attack).            Vital Signs Last 24 Hrs  T(C): 35.8 (11 Jun 2019 05:27), Max: 36.3 (10 Harsha 2019 20:33)  T(F): 96.4 (11 Jun 2019 05:27), Max: 97.4 (10 Harsha 2019 20:33)  HR: 63 (11 Jun 2019 05:27) (60 - 63)  BP: 169/72 (11 Jun 2019 05:27) (144/66 - 171/74)  BP(mean): --  RR: 16 (11 Jun 2019 05:27) (16 - 16)  SpO2: 96% (11 Jun 2019 07:36) (96% - 96%)    acetaminophen   Tablet .. 650 milliGRAM(s) Oral every 6 hours  amiodarone    Tablet 200 milliGRAM(s) Oral daily  apixaban 2.5 milliGRAM(s) Oral every 12 hours  aspirin  chewable 81 milliGRAM(s) Oral daily  chlorhexidine 4% Liquid 1 Application(s) Topical <User Schedule>  enalapril 2.5 milliGRAM(s) Oral daily  ferrous    sulfate 325 milliGRAM(s) Oral two times a day  oxyCODONE    IR 5 milliGRAM(s) Oral every 6 hours PRN  pantoprazole    Tablet 40 milliGRAM(s) Oral before breakfast  simvastatin 20 milliGRAM(s) Oral at bedtime      PHYSICAL EXAM:  GENERAL: NAD, well-groomed, well-developed, elderly female, alert, responsive  HEAD:  Atraumatic, Normocephalic  EYES: EOMI, PERRLA, conjunctiva and sclera clear  HEENT: No tonsillar erythema, exudates, or enlargement; Moist mucous membranes, Good dentition, No lesions  NECK: Supple, No JVD, Normal thyroid  NERVOUS SYSTEM:  Alert & Oriented X3, Good concentration; Motor Strength 5/5 B/L upper and lower extremities; DTRs 2+ intact and symmetric  CHEST/LUNG: Clear to percussion bilaterally; No rales, rhonchi, wheezing, or rubs  HEART: Regular rate and rhythm; No murmurs, rubs, or gallops  ABDOMEN: Soft, Nontender, Nondistended; Bowel sounds present  EXTREMITIES:  2+ Peripheral Pulses, No clubbing, cyanosis, LE casted, no edema  LYMPH: No lymphadenopathy noted  SKIN: No rashes or lesions      LABS                         11.4   6.95  )-----------( 254      ( 10 Harsha 2019 21:13 )             33.8     06-10    140  |  106  |  11  ----------------------------<  148<H>  4.2   |  24  |  0.7    Ca    9.4      10 Harsha 2019 21:13  Phos  3.6     06-10  Mg     2.3     06-10    TPro  7.0  /  Alb  4.3  /  TBili  0.3  /  DBili  x   /  AST  16  /  ALT  16  /  AlkPhos  68  06-09      RADIOLOGY & ADDITIONAL TESTS:    ASSESSMENT & PLAN:     89F s/p trip and fall on rug, +ht, -loc, +eliquis, Right lateral malleolar fracture  -Ortho consult noted, casted, NWB, PT eval noted for SNF short term Rehab,, head CT neg for bleed    -DM, doing well on Januvia    -A Fib, continue Eliquis, rate controlled, on Amiodarone    -Hx TIA, continue current meds    -Hyperlipidemia - continue statin

## 2019-06-12 ENCOUNTER — TRANSCRIPTION ENCOUNTER (OUTPATIENT)
Age: 84
End: 2019-06-12

## 2019-06-12 LAB
GLUCOSE BLDC GLUCOMTR-MCNC: 231 MG/DL — HIGH (ref 70–99)
GLUCOSE BLDC GLUCOMTR-MCNC: 332 MG/DL — HIGH (ref 70–99)
GLUCOSE BLDC GLUCOMTR-MCNC: 349 MG/DL — HIGH (ref 70–99)

## 2019-06-12 RX ORDER — INSULIN LISPRO 100/ML
10 VIAL (ML) SUBCUTANEOUS ONCE
Refills: 0 | Status: COMPLETED | OUTPATIENT
Start: 2019-06-12 | End: 2019-06-12

## 2019-06-12 RX ORDER — ACETAMINOPHEN 500 MG
650 TABLET ORAL EVERY 6 HOURS
Refills: 0 | Status: DISCONTINUED | OUTPATIENT
Start: 2019-06-12 | End: 2019-06-13

## 2019-06-12 RX ADMIN — Medication 650 MILLIGRAM(S): at 06:02

## 2019-06-12 RX ADMIN — Medication 325 MILLIGRAM(S): at 18:17

## 2019-06-12 RX ADMIN — APIXABAN 2.5 MILLIGRAM(S): 2.5 TABLET, FILM COATED ORAL at 18:17

## 2019-06-12 RX ADMIN — Medication 2.5 MILLIGRAM(S): at 06:02

## 2019-06-12 RX ADMIN — PANTOPRAZOLE SODIUM 40 MILLIGRAM(S): 20 TABLET, DELAYED RELEASE ORAL at 06:01

## 2019-06-12 RX ADMIN — AMIODARONE HYDROCHLORIDE 200 MILLIGRAM(S): 400 TABLET ORAL at 06:02

## 2019-06-12 RX ADMIN — CHLORHEXIDINE GLUCONATE 1 APPLICATION(S): 213 SOLUTION TOPICAL at 06:01

## 2019-06-12 RX ADMIN — Medication 10 UNIT(S): at 21:58

## 2019-06-12 RX ADMIN — Medication 650 MILLIGRAM(S): at 06:39

## 2019-06-12 RX ADMIN — Medication 81 MILLIGRAM(S): at 11:52

## 2019-06-12 RX ADMIN — Medication 650 MILLIGRAM(S): at 00:16

## 2019-06-12 RX ADMIN — APIXABAN 2.5 MILLIGRAM(S): 2.5 TABLET, FILM COATED ORAL at 06:01

## 2019-06-12 RX ADMIN — Medication 325 MILLIGRAM(S): at 06:01

## 2019-06-12 RX ADMIN — SIMVASTATIN 20 MILLIGRAM(S): 20 TABLET, FILM COATED ORAL at 21:34

## 2019-06-12 NOTE — DISCHARGE NOTE PROVIDER - NSDCCPCAREPLAN_GEN_ALL_CORE_FT
PRINCIPAL DISCHARGE DIAGNOSIS  Diagnosis: Fracture of fibula  Assessment and Plan of Treatment: Stable. Surgical management. Please take your medications as prescribed. Please follow up with your orthopediac surgeon Dr. Grover ([phone]) in 1 week.      SECONDARY DISCHARGE DIAGNOSES  Diagnosis: Hypertension  Assessment and Plan of Treatment: Stable. Medical management. Please take your medications as prescribed. Please follow up with your PMD. Please avoid food high in salt. Please check your blood pressure regularly.    Diagnosis: Afib  Assessment and Plan of Treatment: Stable. Medical management. Please your medications as prescribed. Please follow up with your PMD. PRINCIPAL DISCHARGE DIAGNOSIS  Diagnosis: Fracture of fibula  Assessment and Plan of Treatment: Stable. Surgical management. Please take your medications as prescribed. Please follow up with your orthopediac surgeon Dr. Grover ([phone]) in 1 week.      SECONDARY DISCHARGE DIAGNOSES  Diagnosis: DM (diabetes mellitus)  Assessment and Plan of Treatment: Stable. Medical management. Please take your medications as prescribed. Please follow up with your PMD. Please check your finger stick glucose regularly. Please avoid food high in refined sugar.    Diagnosis: Hypertension  Assessment and Plan of Treatment: Stable. Medical management. Please take your medications as prescribed. Please follow up with your PMD. Please avoid food high in salt. Please check your blood pressure regularly.    Diagnosis: Afib  Assessment and Plan of Treatment: Stable. Medical management. Please your medications as prescribed. Please follow up with your PMD.

## 2019-06-12 NOTE — DISCHARGE NOTE PROVIDER - PROVIDER TOKENS
PROVIDER:[TOKEN:[[id number]],FOLLOWUP:[1 week]],PROVIDER:[TOKEN:[[id number]],FOLLOWUP:[1 week]]

## 2019-06-12 NOTE — PROGRESS NOTE ADULT - SUBJECTIVE AND OBJECTIVE BOX
ALBARO HAMILTON 89y Female  MRN#: [id number]     SUBJECTIVE  Patient is a 89y old Female who presents with a chief complaint of s/p trip and fall, +ht, -loc, +eliquis (11 Jun 2019 08:13)    Today is hospital day 2d, and this morning she is lying in bed without distress.   No acute overnight events.     OBJECTIVE  PAST MEDICAL & SURGICAL HISTORY  Atrial fibrillation  TIA (transient ischemic attack)  Diabetes mellitus  Hyperlipidemia  Hypertension    ALLERGIES:  penicillin (Anaphylaxis; Hives)    MEDICATIONS:  STANDING MEDICATIONS  acetaminophen   Tablet .. 650 milliGRAM(s) Oral every 6 hours  amiodarone    Tablet 200 milliGRAM(s) Oral daily  apixaban 2.5 milliGRAM(s) Oral every 12 hours  aspirin  chewable 81 milliGRAM(s) Oral daily  chlorhexidine 4% Liquid 1 Application(s) Topical <User Schedule>  enalapril 2.5 milliGRAM(s) Oral daily  ferrous    sulfate 325 milliGRAM(s) Oral two times a day  pantoprazole    Tablet 40 milliGRAM(s) Oral before breakfast  simvastatin 20 milliGRAM(s) Oral at bedtime    PRN MEDICATIONS  oxyCODONE    IR 5 milliGRAM(s) Oral every 6 hours PRN    HOME MEDICATIONS  Home Medications:  amiodarone 200 mg oral tablet: 1 tab(s) orally once a day (09 Jun 2019 23:52)  aspirin 81 mg oral tablet: 1 tab(s) orally once a day (09 Jun 2019 23:52)  enalapril 2.5 mg oral tablet: 1 tab(s) orally once a day (09 Jun 2019 23:52)  ferrous sulfate 325 mg (65 mg elemental iron) oral tablet:  (09 Jun 2019 23:52)  glipiZIDE 5 mg oral tablet: 1 tab(s) orally every 12 hours (09 Jun 2019 23:52)  Januvia 50 mg oral tablet: 1 tab(s) orally once a day (09 Jun 2019 23:52)  metFORMIN 500 mg oral tablet: 1 tab(s) orally 2 times a day (09 Jun 2019 23:52)  simvastatin 20 mg oral tablet: 1 tab(s) orally once a day (at bedtime) (09 Jun 2019 23:52)  Vitamin B12 1000 mcg/mL injectable solution:  (09 Jun 2019 23:52)      VITAL SIGNS: Last 24 Hours  T(C): 36.1 (12 Jun 2019 05:00), Max: 36.1 (11 Jun 2019 20:40)  T(F): 96.9 (12 Jun 2019 05:00), Max: 96.9 (11 Jun 2019 20:40)  HR: 60 (12 Jun 2019 05:00) (60 - 63)  BP: 140/59 (12 Jun 2019 05:00) (122/57 - 157/63)  BP(mean): --  RR: 18 (12 Jun 2019 05:00) (18 - 18)  SpO2: 97% (12 Jun 2019 05:00) (95% - 97%)    06-11-19 @ 07:01  -  06-12-19 @ 07:00  --------------------------------------------------------  IN: 220 mL / OUT: 850 mL / NET: -630 mL    06-12-19 @ 07:01  -  06-12-19 @ 10:35  --------------------------------------------------------  IN: 560 mL / OUT: 0 mL / NET: 560 mL        LABS:                        12.2   6.57  )-----------( 263      ( 11 Jun 2019 20:32 )             36.9     06-11    133<L>  |  96<L>  |  14  ----------------------------<  215<H>  4.7   |  24  |  1.0    Ca    9.4      11 Jun 2019 20:32  Phos  3.6     06-11  Mg     2.4     06-11    Culture - Urine (collected 09 Jun 2019 23:32)  Source: .Urine Clean Catch (Midstream)  Final Report (11 Jun 2019 09:21):    <10,000 CFU/mL Normal Urogenital Bruna          CAPILLARY BLOOD GLUCOSE    RADIOLOGY:    < from: CT Head No Cont (06.09.19 @ 22:45) >  IMPRESSION:   No evidence of intracranial hemorrhage, territorial infarct, or mass   effect.  < end of copied text >      PHYSICAL EXAM:  GENERAL: NAD, 89y F  HEAD:  Atraumatic, Normocephalic  EYES: EOMI, conjunctiva clear and sclera white  NECK: Supple, No JVD  CHEST/LUNG: Clear to auscultation bilaterally; No wheeze; No crackles; No accessory muscles used  HEART: Regular rate and rhythm; No murmurs;   ABDOMEN: Soft, Nontender, Nondistended; Bowel sounds present; No guarding  EXTREMITIES:  2+ Peripheral Pulses, No cyanosis or edema  NEUROLOGY: non-focal    ADMISSION SUMMARY  Patient is a 89y old Female who presents with a chief complaint of s/p trip and fall, +ht, -loc, +eliquis (11 Jun 2019 08:13)

## 2019-06-12 NOTE — DISCHARGE NOTE PROVIDER - HOSPITAL COURSE
89 yo F w/ PMH of Afib on Eliquis, DM, HLD, HTN and TIA presents to the hospital with a mechanical fall. Admitted to [facility] for right fibular fracture s/p casting.         # Mechanical fall with right fibular fracture s/p casting    - negative head trauma with CTH NC     - trauma workup negative other than right fibular fracture    - f/u with ortho as OP     - c/w eliquis         # Afib, controlled    - c/w Eliquis     - rate controlled with amidarone        # DM     - f/u FS     - will start insulin if FS > 180 x 2        # HTN/HLD     - c/w enalapril     - c/w simvastatin        Activity: increased as tolerated     Disposition: [facility]

## 2019-06-12 NOTE — DISCHARGE NOTE PROVIDER - CARE PROVIDER_API CALL
Elian Grover)  Orthopaedic Surgery  [address] [place] [address]  Phone: [phone]  Fax: [phone]  Follow Up Time: 1 week    Madeline Ny)  Internal Medicine  [address] [place] [address]  Phone: [phone]  Fax: [phone]  Follow Up Time: 1 week

## 2019-06-12 NOTE — PROGRESS NOTE ADULT - ASSESSMENT
89 yo F w/ PMH of Afib on Eliquis, DM, HLD, HTN and TIA presents to the hospital with a mechanical fall. Admitted to [facility] for right fibular fracture s/p casting.     # Mechanical fall with right fibular fracture s/p casting  - negative head trauma with CTH NC   - trauma workup negative other than right fibular fracture  - f/u with ortho as OP   - c/w eliquis     # Afib, controlled  - c/w Eliquis   - rate controlled with amidarone    # DM   - f/u FS   - will start insulin if FS > 180 x 2    # HTN/HLD   - c/w enalapril   - c/w simvastatin    GI ppx: Protonix 40 mg qD   DVT ppx: Eliquis   Activity: increased as tolerated   Code status: Full Code ( X ) / DNR (  ) / DNI (  )  Disposition: pending [facility] authorization    ( X ) Discussion with patient and/or family regarding goals of care

## 2019-06-13 ENCOUNTER — TRANSCRIPTION ENCOUNTER (OUTPATIENT)
Age: 84
End: 2019-06-13

## 2019-06-13 VITALS
RESPIRATION RATE: 18 BRPM | HEART RATE: 60 BPM | SYSTOLIC BLOOD PRESSURE: 120 MMHG | DIASTOLIC BLOOD PRESSURE: 59 MMHG | TEMPERATURE: 98 F

## 2019-06-13 LAB — GLUCOSE BLDC GLUCOMTR-MCNC: 433 MG/DL — HIGH (ref 70–99)

## 2019-06-13 RX ORDER — INSULIN GLARGINE 100 [IU]/ML
12 INJECTION, SOLUTION SUBCUTANEOUS AT BEDTIME
Refills: 0 | Status: DISCONTINUED | OUTPATIENT
Start: 2019-06-13 | End: 2019-06-13

## 2019-06-13 RX ORDER — PREGABALIN 225 MG/1
0 CAPSULE ORAL
Qty: 0 | Refills: 0 | DISCHARGE

## 2019-06-13 RX ORDER — GLUCAGON INJECTION, SOLUTION 0.5 MG/.1ML
1 INJECTION, SOLUTION SUBCUTANEOUS ONCE
Refills: 0 | Status: DISCONTINUED | OUTPATIENT
Start: 2019-06-13 | End: 2019-06-13

## 2019-06-13 RX ORDER — DEXTROSE 50 % IN WATER 50 %
25 SYRINGE (ML) INTRAVENOUS ONCE
Refills: 0 | Status: DISCONTINUED | OUTPATIENT
Start: 2019-06-13 | End: 2019-06-13

## 2019-06-13 RX ORDER — AMIODARONE HYDROCHLORIDE 400 MG/1
1 TABLET ORAL
Qty: 0 | Refills: 0 | DISCHARGE

## 2019-06-13 RX ORDER — SIMVASTATIN 20 MG/1
1 TABLET, FILM COATED ORAL
Qty: 0 | Refills: 0 | DISCHARGE

## 2019-06-13 RX ORDER — INSULIN LISPRO 100/ML
4 VIAL (ML) SUBCUTANEOUS
Refills: 0 | Status: DISCONTINUED | OUTPATIENT
Start: 2019-06-13 | End: 2019-06-13

## 2019-06-13 RX ORDER — SODIUM CHLORIDE 9 MG/ML
1000 INJECTION, SOLUTION INTRAVENOUS
Refills: 0 | Status: DISCONTINUED | OUTPATIENT
Start: 2019-06-13 | End: 2019-06-13

## 2019-06-13 RX ORDER — METFORMIN HYDROCHLORIDE 850 MG/1
1 TABLET ORAL
Qty: 0 | Refills: 0 | DISCHARGE

## 2019-06-13 RX ORDER — INSULIN LISPRO 100/ML
VIAL (ML) SUBCUTANEOUS
Refills: 0 | Status: DISCONTINUED | OUTPATIENT
Start: 2019-06-13 | End: 2019-06-13

## 2019-06-13 RX ORDER — ASPIRIN/CALCIUM CARB/MAGNESIUM 324 MG
1 TABLET ORAL
Qty: 0 | Refills: 0 | DISCHARGE

## 2019-06-13 RX ORDER — DEXTROSE 50 % IN WATER 50 %
15 SYRINGE (ML) INTRAVENOUS ONCE
Refills: 0 | Status: DISCONTINUED | OUTPATIENT
Start: 2019-06-13 | End: 2019-06-13

## 2019-06-13 RX ORDER — FERROUS SULFATE 325(65) MG
0 TABLET ORAL
Qty: 0 | Refills: 0 | DISCHARGE

## 2019-06-13 RX ORDER — SITAGLIPTIN 50 MG/1
1 TABLET, FILM COATED ORAL
Qty: 0 | Refills: 0 | DISCHARGE

## 2019-06-13 RX ORDER — DEXTROSE 50 % IN WATER 50 %
12.5 SYRINGE (ML) INTRAVENOUS ONCE
Refills: 0 | Status: DISCONTINUED | OUTPATIENT
Start: 2019-06-13 | End: 2019-06-13

## 2019-06-13 RX ADMIN — Medication 325 MILLIGRAM(S): at 06:02

## 2019-06-13 RX ADMIN — APIXABAN 2.5 MILLIGRAM(S): 2.5 TABLET, FILM COATED ORAL at 06:02

## 2019-06-13 RX ADMIN — Medication 6: at 12:01

## 2019-06-13 RX ADMIN — CHLORHEXIDINE GLUCONATE 1 APPLICATION(S): 213 SOLUTION TOPICAL at 06:01

## 2019-06-13 RX ADMIN — Medication 2.5 MILLIGRAM(S): at 06:01

## 2019-06-13 RX ADMIN — PANTOPRAZOLE SODIUM 40 MILLIGRAM(S): 20 TABLET, DELAYED RELEASE ORAL at 06:01

## 2019-06-13 RX ADMIN — Medication 4 UNIT(S): at 12:01

## 2019-06-13 RX ADMIN — AMIODARONE HYDROCHLORIDE 200 MILLIGRAM(S): 400 TABLET ORAL at 06:02

## 2019-06-13 RX ADMIN — Medication 81 MILLIGRAM(S): at 11:58

## 2019-06-13 NOTE — PROGRESS NOTE ADULT - SUBJECTIVE AND OBJECTIVE BOX
ALBARO HAMILTON 89y Female  MRN#: [id number]     SUBJECTIVE  Patient is a 89y old Female who presents with a chief complaint of s/p mechanical fall (12 Jun 2019 15:05)    Today is hospital day 3d, and this morning she is lying in bed without distress.   No acute overnight events.     OBJECTIVE  PAST MEDICAL & SURGICAL HISTORY  Atrial fibrillation  TIA (transient ischemic attack)  Diabetes mellitus  Hyperlipidemia  Hypertension    ALLERGIES:  penicillin (Anaphylaxis; Hives)    MEDICATIONS:  STANDING MEDICATIONS  amiodarone    Tablet 200 milliGRAM(s) Oral daily  apixaban 2.5 milliGRAM(s) Oral every 12 hours  aspirin  chewable 81 milliGRAM(s) Oral daily  chlorhexidine 4% Liquid 1 Application(s) Topical <User Schedule>  enalapril 2.5 milliGRAM(s) Oral daily  ferrous    sulfate 325 milliGRAM(s) Oral two times a day  pantoprazole    Tablet 40 milliGRAM(s) Oral before breakfast  simvastatin 20 milliGRAM(s) Oral at bedtime    PRN MEDICATIONS  acetaminophen   Tablet .. 650 milliGRAM(s) Oral every 6 hours PRN  oxyCODONE    IR 5 milliGRAM(s) Oral every 6 hours PRN    HOME MEDICATIONS  Home Medications:  amiodarone 200 mg oral tablet: 1 tab(s) orally once a day (09 Jun 2019 23:52)  aspirin 81 mg oral tablet: 1 tab(s) orally once a day (09 Jun 2019 23:52)  enalapril 2.5 mg oral tablet: 1 tab(s) orally once a day (09 Jun 2019 23:52)  ferrous sulfate 325 mg (65 mg elemental iron) oral tablet:  (09 Jun 2019 23:52)  glipiZIDE 5 mg oral tablet: 1 tab(s) orally every 12 hours (09 Jun 2019 23:52)  Januvia 50 mg oral tablet: 1 tab(s) orally once a day (09 Jun 2019 23:52)  metFORMIN 500 mg oral tablet: 1 tab(s) orally 2 times a day (09 Jun 2019 23:52)  simvastatin 20 mg oral tablet: 1 tab(s) orally once a day (at bedtime) (09 Jun 2019 23:52)  Vitamin B12 1000 mcg/mL injectable solution:  (09 Jun 2019 23:52)      VITAL SIGNS: Last 24 Hours  T(C): 35.9 (13 Jun 2019 05:52), Max: 36.1 (12 Jun 2019 14:14)  T(F): 96.7 (13 Jun 2019 05:52), Max: 97 (12 Jun 2019 21:12)  HR: 62 (13 Jun 2019 05:52) (61 - 62)  BP: 133/60 (13 Jun 2019 05:52) (116/57 - 160/64)  BP(mean): --  RR: 18 (13 Jun 2019 05:52) (17 - 18)  SpO2: 96% (12 Jun 2019 21:06) (96% - 96%)    06-12-19 @ 07:01  -  06-13-19 @ 07:00  --------------------------------------------------------  IN: 560 mL / OUT: 500 mL / NET: 60 mL        LABS:                        12.2   6.57  )-----------( 263      ( 11 Jun 2019 20:32 )             36.9     06-11    133<L>  |  96<L>  |  14  ----------------------------<  215<H>  4.7   |  24  |  1.0    Ca    9.4      11 Jun 2019 20:32  Phos  3.6     06-11  Mg     2.4     06-11      CAPILLARY BLOOD GLUCOSE  POCT Blood Glucose.: 332 mg/dL (12 Jun 2019 22:33)    RADIOLOGY:      PHYSICAL EXAM:  GENERAL: NAD, AAO x 2, 89y F  HEAD:  Atraumatic, Normocephalic  EYES: EOMI, conjunctiva clear and sclera white  NECK: Supple, No JVD  CHEST/LUNG: Clear to auscultation bilaterally; No wheeze; No crackles; No accessory muscles used  HEART: Regular rate and rhythm; No murmurs;   ABDOMEN: Soft, Nontender, Nondistended; Bowel sounds present; No guarding  EXTREMITIES:  2+ Peripheral Pulses, No cyanosis or edema  NEUROLOGY: non-focal    ADMISSION SUMMARY  Patient is a 89y old Female who presents with a chief complaint of s/p mechanical fall (12 Jun 2019 15:05)

## 2019-06-13 NOTE — PROGRESS NOTE ADULT - REASON FOR ADMISSION
s/p trip and fall, +ht, -loc, +eliquis

## 2019-06-13 NOTE — PROGRESS NOTE ADULT - ASSESSMENT
89 yo F w/ PMH of Afib on Eliquis, DM, HLD, HTN and TIA presents to the hospital with a mechanical fall. Admitted to [facility] for right fibular fracture s/p casting.     # Mechanical fall with right fibular fracture s/p casting  - negative head trauma with CTH NC   - trauma workup negative other than right fibular fracture  - f/u with ortho as OP   - c/w eliquis     # Afib, controlled  - c/w Eliquis   - rate controlled with amidarone    # DM   - f/u FS   - will start insulin if FS > 180 x 2    # HTN/HLD   - c/w enalapril   - c/w simvastatin    GI ppx: Protonix 40 mg qD   DVT ppx: Eliquis   Activity: increased as tolerated   Code status: Full Code ( X ) / DNR (  ) / DNI (  )  Disposition: pending [facility] bed    ( X ) Discussion with patient and/or family regarding goals of care

## 2019-06-13 NOTE — DISCHARGE NOTE NURSING/CASE MANAGEMENT/SOCIAL WORK - NSDCDPATPORTLINK_GEN_ALL_CORE
You can access the [facility] Patient Portal, offered by [facility], by registering with the following website: [email or web address]

## 2019-06-13 NOTE — DISCHARGE NOTE NURSING/CASE MANAGEMENT/SOCIAL WORK - NSDCPEPT PROEDMA_GEN_ALL_CORE
Surgery consulted  Exploratory surgery on 12/19/2018, seen by Dr Kohli  Was on heparin drip than Lovenox for bridging, warfarin   Monitor INR.  Goal between 2 and 3.  Awaiting SNF placement.  Denise wound VAC to be removed tomorrow 12/28.   Yes

## 2019-06-17 ENCOUNTER — OUTPATIENT (OUTPATIENT)
Dept: OUTPATIENT SERVICES | Facility: HOSPITAL | Age: 84
LOS: 1 days | Discharge: HOME | End: 2019-06-17

## 2019-06-18 DIAGNOSIS — R79.9 ABNORMAL FINDING OF BLOOD CHEMISTRY, UNSPECIFIED: ICD-10-CM

## 2019-06-19 DIAGNOSIS — E11.9 TYPE 2 DIABETES MELLITUS WITHOUT COMPLICATIONS: ICD-10-CM
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
## 2019-06-19 DIAGNOSIS — Z79.01 LONG TERM (CURRENT) USE OF ANTICOAGULANTS: ICD-10-CM

## 2019-06-19 DIAGNOSIS — Z88.0 ALLERGY STATUS TO PENICILLIN: ICD-10-CM

## 2019-06-19 DIAGNOSIS — I10 ESSENTIAL (PRIMARY) HYPERTENSION: ICD-10-CM

## 2019-06-19 DIAGNOSIS — R55 SYNCOPE AND COLLAPSE: ICD-10-CM

## 2019-06-19 DIAGNOSIS — Z79.82 LONG TERM (CURRENT) USE OF ASPIRIN: ICD-10-CM
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
## 2019-06-19 DIAGNOSIS — Z86.73 PERSONAL HISTORY OF TRANSIENT ISCHEMIC ATTACK (TIA), AND CEREBRAL INFARCTION WITHOUT RESIDUAL DEFICITS: ICD-10-CM

## 2019-06-19 DIAGNOSIS — S82.64XA NONDISPLACED FRACTURE OF LATERAL MALLEOLUS OF RIGHT FIBULA, INITIAL ENCOUNTER FOR CLOSED FRACTURE: ICD-10-CM

## 2019-06-19 DIAGNOSIS — I48.91 UNSPECIFIED ATRIAL FIBRILLATION: ICD-10-CM
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
## 2019-06-19 DIAGNOSIS — E78.5 HYPERLIPIDEMIA, UNSPECIFIED: ICD-10-CM

## 2019-06-19 DIAGNOSIS — S00.83XA CONTUSION OF OTHER PART OF HEAD, INITIAL ENCOUNTER: ICD-10-CM

## 2019-06-24 ENCOUNTER — OUTPATIENT (OUTPATIENT)
Dept: OUTPATIENT SERVICES | Facility: HOSPITAL | Age: 84
LOS: 1 days | Discharge: HOME | End: 2019-06-24

## 2019-06-25 DIAGNOSIS — I48.91 UNSPECIFIED ATRIAL FIBRILLATION: ICD-10-CM

## 2019-06-25 DIAGNOSIS — E11.9 TYPE 2 DIABETES MELLITUS WITHOUT COMPLICATIONS: ICD-10-CM

## 2019-06-29 ENCOUNTER — OUTPATIENT (OUTPATIENT)
Dept: OUTPATIENT SERVICES | Facility: HOSPITAL | Age: 84
LOS: 1 days | Discharge: HOME | End: 2019-06-29

## 2019-06-29 DIAGNOSIS — E72.20 DISORDER OF UREA CYCLE METABOLISM, UNSPECIFIED: ICD-10-CM

## 2019-06-29 DIAGNOSIS — I10 ESSENTIAL (PRIMARY) HYPERTENSION: ICD-10-CM

## 2019-06-29 DIAGNOSIS — R50.9 FEVER, UNSPECIFIED: ICD-10-CM

## 2019-07-02 ENCOUNTER — OUTPATIENT (OUTPATIENT)
Dept: OUTPATIENT SERVICES | Facility: HOSPITAL | Age: 84
LOS: 1 days | Discharge: HOME | End: 2019-07-02

## 2019-07-02 DIAGNOSIS — E72.20 DISORDER OF UREA CYCLE METABOLISM, UNSPECIFIED: ICD-10-CM

## 2019-07-10 ENCOUNTER — OUTPATIENT (OUTPATIENT)
Dept: OUTPATIENT SERVICES | Facility: HOSPITAL | Age: 84
LOS: 1 days | Discharge: HOME | End: 2019-07-10

## 2019-07-15 DIAGNOSIS — R79.9 ABNORMAL FINDING OF BLOOD CHEMISTRY, UNSPECIFIED: ICD-10-CM

## 2019-07-15 DIAGNOSIS — D64.9 ANEMIA, UNSPECIFIED: ICD-10-CM

## 2019-08-21 NOTE — ED CDU PROVIDER DISPOSITION NOTE - DISCHARGE REVIEW MATERIAL PRESENTED
New Patient Pulmonary Office Visit      Patient Name: Delores Sprague    Referring Physician: Jojo Alex, *    Chief Complaint:    Chief Complaint   Patient presents with   • Dyspnea on Exertion       History of Present Illness: Delores Sprague is a 29 y.o. female who is here today to establish care with Pulmonary.     Dyspnea  Patient complains of shortness of breath. Symptoms occur while supine only, after one flight stairs, with one block walking. Symptoms began 5 months ago, gradually worsening since. Associated symptoms include  constant cough, difficulty breathing, post nasal drip and tightness in chest. She denies frequent throat clearing, hoarseness and sputum production.  Symptoms are exacerbated by moderate activity and talking. Symptoms are alleviated by rest.     Asthma  She presents for initial evaluation of dyspnea on exertion. The patient has not been previously diagnosed with asthma. The patient is not currently have symptoms / an exacerbation.     Current Disease Severity  The patient is having daytime symptoms daily. The patient is having daytime symptoms often 7 times per week. The patient is using short-acting beta agonists for symptom control daily. She has exacerbations requiring oral systemic corticosteroids 3 times per year. Current limitations in activity from asthma: can't exercise, sob with daily activities, and nightly cough. Number of days of school or work missed in the last month: 0. Number of urgent/emergent visit in last year: 0.  The patient is not using a spacer with MDIs.    Review of Systems:   Review of Systems   Constitutional: Negative for activity change, appetite change, chills and diaphoresis.   HENT: Negative for congestion, postnasal drip, sinus pressure and voice change.    Eyes: Negative for blurred vision.   Respiratory: Negative for cough, shortness of breath and wheezing.    Cardiovascular: Negative for chest pain.   Gastrointestinal: Negative  for abdominal pain.   Musculoskeletal: Negative for myalgias.   Skin: Negative for color change and dry skin.   Allergic/Immunologic: Negative for environmental allergies.   Neurological: Negative for weakness and confusion.   Hematological: Negative for adenopathy.   Psychiatric/Behavioral: Negative for sleep disturbance and depressed mood.       Past Medical History:   Past Medical History:   Diagnosis Date   • Arthritis    • Depression    • Diverticulosis    • GERD (gastroesophageal reflux disease)    • Lupus        Past Surgical History:   Past Surgical History:   Procedure Laterality Date   •  SECTION     • GALLBLADDER SURGERY     • LAPAROSCOPIC CHOLECYSTECTOMY  2019   • SALPINGECTOMY Bilateral        Family History:   Family History   Problem Relation Age of Onset   • Diabetes Mother    • Hypertension Mother    • Hyperlipidemia Mother    • Diabetes Father    • Mental illness Father    • Pancreatic cancer Paternal Grandfather    • Liver cancer Other    • Breast cancer Other    • Uterine cancer Other    • Lung cancer Other    • Lung cancer Maternal Grandmother    • COPD Maternal Grandfather        Social History:   Social History     Socioeconomic History   • Marital status:      Spouse name: Not on file   • Number of children: Not on file   • Years of education: Not on file   • Highest education level: Not on file   Tobacco Use   • Smoking status: Never Smoker   • Smokeless tobacco: Never Used   Substance and Sexual Activity   • Alcohol use: No   • Drug use: No   • Sexual activity: Defer       Medications:     Current Outpatient Medications:   •  busPIRone (BUSPAR) 5 MG tablet, Take 1-2 tablets by mouth every 8 hours as needed., Disp: 180 tablet, Rfl: 5  •  sertraline (ZOLOFT) 50 MG tablet, Take 1.5 tablets by mouth Daily., Disp: 135 tablet, Rfl: 3  •  albuterol sulfate  (90 Base) MCG/ACT inhaler, Inhale 2 puffs Every 4 (Four) Hours As Needed for Wheezing or Shortness of Air., Disp: 1  "inhaler, Rfl: 11  •  azithromycin (ZITHROMAX Z-ROSY) 250 MG tablet, Take 2 tablets the first day, then 1 tablet daily for 4 days., Disp: 6 tablet, Rfl: 0  •  brompheniramine-pseudoephedrine-DM 30-2-10 MG/5ML syrup, Take 10 mL by mouth 4 (Four) Times a Day As Needed for Congestion, Cough or Allergies., Disp: 200 mL, Rfl: 0  •  budesonide-formoterol (SYMBICORT) 160-4.5 MCG/ACT inhaler, Inhale 2 puffs 2 (Two) Times a Day., Disp: 1 inhaler, Rfl: 5  •  cetirizine (zyrTEC) 10 MG tablet, Take 1 tablet by mouth Daily., Disp: 30 tablet, Rfl: 11  •  eletriptan (RELPAX) 40 MG tablet, Take 1 tablet by mouth 1 (One) Time As Needed for Migraine for up to 1 dose. may repeat in 2 hours if necessary, Disp: 6 tablet, Rfl: 5  •  fluticasone (FLONASE) 50 MCG/ACT nasal spray, 1-2 sprays each nostril daily, Disp: 1 bottle, Rfl: 11  •  Fluticasone Furoate-Vilanterol (BREO ELLIPTA) 200-25 MCG/INH inhaler, Inhale 1 puff Daily., Disp: 14 each, Rfl: 0  •  montelukast (SINGULAIR) 10 MG tablet, Take 1 tablet by mouth Every Night., Disp: 30 tablet, Rfl: 5  •  promethazine (PHENERGAN) 25 MG tablet, Take 1 tablet by mouth Every 6 (Six) Hours As Needed for Nausea or Vomiting (migraine onset)., Disp: 20 tablet, Rfl: 0    Allergies:   No Known Allergies    Physical Exam:  Vital Signs:   Vitals:    08/21/19 1017   BP: 128/80   BP Location: Left arm   Patient Position: Sitting   Pulse: 57   Temp: 97.4 °F (36.3 °C)   SpO2: 92%  Comment: resting at room air   Weight: 121 kg (266 lb)   Height: 172.5 cm (67.9\")       Physical Exam   Constitutional: She is oriented to person, place, and time. She appears well-developed.   HENT:   Head: Normocephalic and atraumatic.   Nose: Nose normal.   Mouth/Throat: Oropharynx is clear and moist.   Eyes: Conjunctivae are normal. Pupils are equal, round, and reactive to light.   Neck: Normal range of motion. Neck supple.   Cardiovascular: Normal rate and regular rhythm. Exam reveals no gallop and no friction rub.   No " murmur heard.  Pulmonary/Chest: Effort normal and breath sounds normal. She has no wheezes. She has no rales.   Abdominal: Soft. Bowel sounds are normal.   Musculoskeletal: Normal range of motion. She exhibits no edema.   Neurological: She is alert and oriented to person, place, and time.   Skin: Skin is warm and dry. No erythema.   Psychiatric: She has a normal mood and affect. Her behavior is normal.   Nursing note and vitals reviewed.      Results Review:   - I personally reviewed the pts imaging from 8/15/19 CXR showed no acute cardiopulmonary process   - I personally reviewed the pts PFT from 8/16/19 showed moderate obstruction with a significant bronchodilator response and a normal DLCO    Assessment / Plan:   28 yo female w/ a hx of Lupus and Asthma who presented today for dyspnea on exertion. I reviewed her CXR and PFT's as noted above. With regards to her PFT's from 8/16/19 compared to July. In July she showed no bronchodilator response, but was on inhalers and steroids at that time. Currently she is off all meds, except for an inhaled corticosteroid that she was on at the time and does show a significant response to bronchodilator. Given her symptoms and positive PFT's this is consistent with asthma, likely exacerbated now due to moving to [place] in the last year with worsening allergies. I have sent for secondary asthma labs to be drawn today. She also has a history of Lupus which is questionable and I have re-sent those labs today per the pts request. I have started the pt on Symbicort 160/4.5 two puffs BID. I have sent a script to her pharmacy, I informed her if the inhaler is to expensive then to ask the pharmacist which one her insurance prefers and call the clinic. I would be happy to switch it for any LABA/ICS which gives her the best coverage. Pt already has albuterol which I recd she continue to take. I have asked her to continue Flonase 2 puff nightly, and Zyrtec daily.    Moderate persistent asthma  without complication  RODARTE (dyspnea on exertion)  Seasonal allergic rhinitis, unspecified trigger  Hx of Lupus  -     CBC With Manual Differential  -     Allergens, Zone 8  -     IgE  -     Aspergillus Fumigatus IgE  -     MARY  -     Sjogren's Antibody, Anti-SS-A / -SS-B  -     Sedimentation Rate  -     Rheumatoid factor  -  CXR reviewed in results  - PFT's reviewed in results  - Start Symbicort; continue albuterol  - Continue Flonase and Zyrtect    Follow Up:   Return in about 3 months (around 11/21/2019).     PRISCILA Paulino, DO  Pulmonary and Critical Care Medicine  Note Electronically Signed    Please note that portions of this note may have been completed with a voice recognition program. Efforts were made to edit the dictations, but occasionally words are mistranscribed.    .

## 2019-09-25 ENCOUNTER — APPOINTMENT (OUTPATIENT)
Dept: CARDIOLOGY | Facility: CLINIC | Age: 84
End: 2019-09-25

## 2019-11-04 NOTE — DISCHARGE NOTE ADULT - HOSPITAL COURSE
88 year old female with PMHx significant for DM, HTN, DLD and history of TIA's presents due to new onset slurred speech and difficulty articulating. CT head was negative for acute stroke. She was offered tPA but pt's family refused. NIHSS 0 on discharge.  Pt had negative MRI findings for stroke.  Pt with recently diagnosed atrial fibrillation with CHADs VASC 7, which was seen via holter monitor outpatient in June.  Pt follows up with Dr. Cantu.  Pt was initially started on lovenox and for discharge, lovenox was discontinued and she was started on eliquis.  Stable for d/c. Bcc Histology Text: There were numerous aggregates of basaloid cells.

## 2019-11-14 ENCOUNTER — OUTPATIENT (OUTPATIENT)
Dept: OUTPATIENT SERVICES | Facility: HOSPITAL | Age: 84
LOS: 1 days | Discharge: HOME | End: 2019-11-14

## 2019-11-15 DIAGNOSIS — R79.9 ABNORMAL FINDING OF BLOOD CHEMISTRY, UNSPECIFIED: ICD-10-CM

## 2019-11-18 NOTE — OCCUPATIONAL THERAPY INITIAL EVALUATION ADULT - AMBULATORY DEVICES NEEDED
Skyrizi Pregnancy And Lactation Text: The risk during pregnancy and breastfeeding is uncertain with this medication. no

## 2021-01-01 NOTE — ED ADULT NURSE NOTE - NSFALLRSKHMRSKTYOTFT_ED_ALL_ED
hypoglycemic
Principal Discharge DX:	Term birth of male   Goal:	healthy baby  Assessment and plan of treatment:	Routine  care and anticipatory guidance.  Secondary Diagnosis:	Large for gestational age   Assessment and plan of treatment:	hypoglycemia protocol

## 2021-06-18 NOTE — ED CDU PROVIDER INITIAL DAY NOTE - OBJECTIVE STATEMENT
89y/o female with pmh of afib on eliquis, dm, htn, tia, pt. with difficulty speaking pta. in er was found to be hypoglycemic. denies ha, numbness, weakness, cp, sob, abdominal pain. pt. is presently at baseline. yes

## 2021-09-01 NOTE — ED ADULT NURSE NOTE - EXTENSIONS OF SELF_ADULT
Colon polyp previously  Follow-up with gastroenterology in the future  No change at the moment  None

## 2022-04-28 NOTE — ED ADULT NURSE NOTE - NSIMPLEMENTINTERV_GEN_ALL_ED
Patient/Family Implemented All Universal Safety Interventions:  [place] to call system. Call bell, personal items and telephone within reach. Instruct patient to call for assistance. Room bathroom lighting operational. Non-slip footwear when patient is off stretcher. Physically safe environment: no spills, clutter or unnecessary equipment. Stretcher in lowest position, wheels locked, appropriate side rails in place.

## 2022-06-09 NOTE — H&P ADULT - HISTORY OF PRESENT ILLNESS
88 year old female with hypertension, hyperlipidemia, diabetes, and newly diagnosed atrial fibrillation presents for aphasia of sudden onset.  Last known normal at 12:20 her daughter spoke to her at 12:50 and patient was having difficulty with her words.  EMS was activated and en route  some improvement was noted.  Patient did not have weakness or numbness and has two prior TIA episodes involving transient aphasia one in oct 2015   and another in Jan 2016.   Pt is baseline fully functional.  Family reports she lives alone and maintains all her ADL's.  She is forgetful of appointments at times but she is oriented to person, place, time and situation mostly.  No fever, no chills, no headache, no nausea, no vomiting, no chest pain, no back pain, no abdominal pain. No focal deficits.  No blurry vision.  No changes in vision, muscle weakness or sensory deficits.
- - -
None

## 2022-09-14 NOTE — ED PROVIDER NOTE - EXITCARE/DISCHARGE INSTRUCTIONS
Physical Therapy: Daily Note   Patient: Jaron Ibarra (36 y.o. male)   Examination Date:   Plan of Care/Certification Expiration Date: 22    No data recorded   :  1928 # of Visits since [facility]:   Visit count could not be calculated. Make sure you are using a visit which is associated with an episode. MRN: [id number]  CSN: [id number] Start of Care Date:   No linked episodes   Insurance: Payor: MEDICARE / Plan: MEDICARE PART A AND B / Product Type: *No Product type* /   Insurance ID: [id number] - (Medicare) Secondary Insurance (if applicable): [place]   Referring Physician: Josefina Matthews MD     PCP: Mayra White MD Visits to Date/Visits Approved:     No Show/Cancelled Appts: 0 / 0     Medical Diagnosis: Spinal stenosis [M48.00]    Treatment Diagnosis: Debility, spinal stenosis and abnormal gait        SUBJECTIVE EXAMINATION   Pain Level: Pain Screening  Patient Currently in Pain: Yes  Pain Assessment: 0-10  Pain Level: 5  Patient's Stated Pain Goal: 2  Pain Type: Chronic pain  Pain Location: Back  Pain Orientation: Right, Left  Pain Descriptors: Aching, Dull    Patient Comments: Subjective: Pt reports 5/10 dull pain low back pain. HEP Compliance: Good        OBJECTIVE EXAMINATION   Restrictions:  No data recorded No data recorded No data recorded      TREATMENT     Exercises:      Treatment Reasoning    Exercise 4: *Heel/toe raises x 15  Exercise 5: *Marching in place x 15 needing less cues  Exercise 6: *Mini Squats x 15 with cues  Exercise 7: *Rows x 15 YTB with tactile cues for form (but needing less cues)  Exercise 8: *Shoulder extension with YTB x 15 with tactilce cues                          Gait: (CPT [id number])  Treatment Reasoning    Gait Training 1: Pt ambulated with increased upright posture with less cues for postural correction. Pt tended to shuffle when he walked but did well with cues to slightly \"march\" and then able to increase his heel strike.  Pt ambulated with his rollator for 130'x 2 Limitations addressed: Mobility, Strength, Flexibility  Therapist provided: Assistance, Manual cuing                   Pt Education: Plan Comment: CP, HP, KT tape, estim, US       ASSESSMENT     Assessment: Assessment: Pt arrives to therapy today with reports of 5/10 low back pain. It was noted that pt ambulated into the dept with more upright posture and was looking straight ahead vs at his feet. PT removed pts KT tape with no irration of his skin noted. Pt was able to perform his standing ther ex needing some cues for form and family reports that pt is performing his Tband ther ex at home. Pt did well with his marches but needing some cues for proper mini squats to decrease his knee pain. Pt then ambulated in the hallway with improved posture and looking straight ahead vs at the ground. Pt tends to shuffle his feet and able to demonstrated increased heel stike with cues to \"march\" while walking. Plan for next session to apply KT tape, add standing hips abd and work on car transfers. Body Structures, Functions, Activity Limitations Requiring Skilled Therapeutic Intervention: Decreased functional mobility , Decreased ROM, Decreased body mechanics, Decreased endurance, Increased pain    Post-Treatment Pain Level:  5/10     No data recorded  Therapy Prognosis: Good       GOALS   Patient goals : To be able to stand up better  Short Term Goals Completed by 1-2 weeks from date of eval Current Status Goal Status   Pt able to perform his HEP 3-5x per week       Pt reports having 3/10 or less back pain with more erect posture during standing and while walking.                                                                            Long Term Goals Completed by 4-6 weeks from date of evaluation Current Status Goal Status   Pt reports having 2/10 or less back pain with ADLS and when on his feet for >15 min       Increase pts core and B LE strength to 4+/5 or better so that pt demonstrates good posture when using his AD       Improve pts HS tightness from 30 degrees B LE to 15 degrees of less to decrease pts back pain and improve his posture with rollator       Pt able to perform Sit to Stand test x 5 reps less than 15 seconds with safe form       Pt/family indep with HEP                                                    TREATMENT PLAN   Plan Frequency: 1-2  Plan weeks: 4-6 weeks  Current Treatment Recommendations: Strengthening, ROM, Balance training, Functional mobility training, Gait training, Pain management, Home exercise program, Safety education & training, Modalities   Plan Comment: CP, HP, KT tape, estim, US       Therapy Time  Individual Time In:      1:45pm    Individual Time Out:  2:30pm  Minutes:   45 min         Electronically signed by Prem Carbone PT  on 9/14/2022 at 5:31 PM [employer] and print the 'Prescriptions from this Visit' Report

## 2023-05-13 NOTE — ED PROVIDER NOTE - MUSCULOSKELETAL, MLM
Patient is in the supine position.   The body was positioned using the following devices: safety strap.  The patient was positioned by Estephania Castanon Bloner, Annie, RN Spine appears normal, moving all extremities, range of motion is not limited, no muscle or joint tenderness

## 2024-03-19 NOTE — ED PROVIDER NOTE - NS ED ATTENDING STATEMENT MOD
I independently performed the documented history, exam, and medical decision making.
I have personally seen and examined this patient.  I have fully participated in the care of this patient. I have reviewed all pertinent clinical information, including history, physical exam, plan and the Resident’s note and agree except as noted.

## 2024-07-03 NOTE — H&P ADULT - ASSESSMENT
-- DO NOT REPLY / DO NOT REPLY ALL --  -- This inbox is not monitored  -- Message is from [facility] [facility] [employer] ([facility]) --    Order Request  Lab: Full Panel     Message / reason: Annual physical exam     Insurance type: see below   Payor: MEDICARE / Plan: PARTA AND B / Product Type: MEDICARE    Preferred Delivery Method  Input in Epic and please call back once completed      Caller Information         Type Contact Phone/Fax    07/03/2024 05:45 PM CDT Phone (Incoming) Nay Hartman (Self) [phone] (M)            Alternative phone number: n/a    Can a detailed message be left? Yes - Voicemail  Patient has been advised the message will be addressed within 2-3 business days.       88 year old female with PMHx significant for DM, HTN, DLD and history of TIA's presents due to new onset slurred speech and difficulty articulating that started today at 12:50.  Pt was NIHSS 1 for performing one task correctly.  CT head was negative for acute stroke.  Pt symptoms continued to improve.  She was offered tPA but pt's family refused.      # TIA    # Atrial Fibrillation    # HTN    # HLD    # DM  - c/w ISS    #FENP:  Fluids:  Electrolytes:  Nutrition:   Prophylaxis:     #Dispo:    #Code Status: 88 year old female with PMHx significant for DM, HTN, DLD and history of TIA's presents due to new onset slurred speech and difficulty articulating that started today at 12:50.  Pt was NIHSS 1 for performing one task correctly.  CT head was negative for acute stroke.  Pt symptoms continued to improve.  She was offered tPA but pt's family refused.      # TIA  - per neuro, Brain MRI brain and MRA head noncontrast, MRA neck with contrast.  - aspirin, plavix, atorvastatin  - hemoglobin a1c, lipid profile  - PT/OT/Speech therapy evaluation.    # Hx of Atrial Fibrillation  -Chads vasc 6 and pt should be anticoagulated.  -will start full AC with lovenox  -consult cardio - dr. pacheco    # HTN  -c/w home medications enalapril    # HLD  -start high dose atorvastatin 80 mg    # DM  - c/w ISS    #FENP:  Fluids: none  Electrolytes: WNL  Nutrition: DASH/ TLC/ Carb consistent  Prophylaxis: Full AC with lovenox    #Dispo: from home    #Code Status: Full 88 year old female with PMHx significant for DM, HTN, DLD and history of TIA's presents due to new onset slurred speech and difficulty articulating that started today at 12:50.  Pt was NIHSS 1 for performing one task correctly.  CT head was negative for acute stroke.  Pt symptoms continued to improve.  She was offered tPA but pt's family refused.      # TIA.  Symptoms at baseline.    - per neuro, Brain MRI brain and MRA head noncontrast, MRA neck with contrast.  - aspirin, plavix, atorvastatin  - hemoglobin a1c, lipid profile  - PT/OT/Speech therapy evaluation.    # Hx of Atrial Fibrillation  -Chads vasc 6 and pt should be anticoagulated.  -will start full AC with lovenox  -consult cardio - dr. pacheco    # HTN  -c/w home medications enalapril    # HLD  -start high dose atorvastatin 80 mg    # DM  - c/w ISS    #FENP:  Fluids: none  Electrolytes: WNL  Nutrition: DASH/ TLC/ Carb consistent  Prophylaxis: Full AC with lovenox    #Dispo: from home    #Code Status: Full
